# Patient Record
Sex: MALE | Race: WHITE
[De-identification: names, ages, dates, MRNs, and addresses within clinical notes are randomized per-mention and may not be internally consistent; named-entity substitution may affect disease eponyms.]

---

## 2018-04-04 ENCOUNTER — HOSPITAL ENCOUNTER (OUTPATIENT)
Dept: HOSPITAL 75 - RAD | Age: 83
End: 2018-04-04
Attending: INTERNAL MEDICINE
Payer: MEDICARE

## 2018-04-04 DIAGNOSIS — R27.0: Primary | ICD-10-CM

## 2018-04-04 PROCEDURE — 70551 MRI BRAIN STEM W/O DYE: CPT

## 2018-04-04 NOTE — DIAGNOSTIC IMAGING REPORT
PROCEDURE: MR imaging of the brain without contrast.



TECHNIQUE: Multiplanar, multisequence MR imaging of the brain was

performed without contrast.



INDICATION: Ataxia.



FINDINGS: This exam is limited as only diffusion series was

obtained. Reportedly, the patient was claustrophobic and could

not tolerate the exam.



There is no mass, shift of the midline, or hemorrhage evident.

The ventricles are not abnormally dilated. There is cortical

atrophy present. The degree of atrophy is consistent with the

patient's age.



There is no abnormal signal arising from the brain to suggest an

area of acute ischemia.



The orbits and sinuses were not well visualized.



IMPRESSION:

1. There is no evidence for an area of acute ischemia.

2. There is no sign of a mass lesion either.

3. These results were called to Dr. Woo.



Dictated by: 



  Dictated on workstation # [id number]

## 2018-05-15 ENCOUNTER — HOSPITAL ENCOUNTER (OUTPATIENT)
Dept: HOSPITAL 75 - RAD | Age: 83
End: 2018-05-15
Attending: INTERNAL MEDICINE
Payer: MEDICARE

## 2018-05-15 DIAGNOSIS — T14.90XA: Primary | ICD-10-CM
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
## 2018-05-15 NOTE — DIAGNOSTIC IMAGING REPORT
INDICATION: Fall. Pelvic pain.



COMPARISON: None.



FINDINGS: AP view of the pelvis and two dedicated radiographic

views of the right hip were obtained. There is no fracture,

dislocation, bone destruction, or radiopaque foreign body. The

visualized pelvic osseous structures and the SI joints

demonstrate no acute fracture or dislocation. There is no bone

destruction or radiopaque foreign body. The surrounding soft

tissue structures are unremarkable. 



IMPRESSION: 

1. No acute fracture or dislocation in the pelvis or right hip

joint.



Dictated by: 



  Dictated on workstation # [id number]

## 2018-05-30 ENCOUNTER — HOSPITAL ENCOUNTER (EMERGENCY)
Dept: HOSPITAL 75 - ER | Age: 83
Discharge: HOME | End: 2018-05-30
Payer: MEDICARE

## 2018-05-30 VITALS — WEIGHT: 175 LBS | HEIGHT: 66 IN | BODY MASS INDEX: 28.12 KG/M2

## 2018-05-30 VITALS — DIASTOLIC BLOOD PRESSURE: 52 MMHG | SYSTOLIC BLOOD PRESSURE: 109 MMHG

## 2018-05-30 DIAGNOSIS — Z86.718: ICD-10-CM

## 2018-05-30 DIAGNOSIS — Z87.891: ICD-10-CM

## 2018-05-30 DIAGNOSIS — I10: ICD-10-CM

## 2018-05-30 DIAGNOSIS — M70.21: ICD-10-CM

## 2018-05-30 DIAGNOSIS — L03.113: Primary | ICD-10-CM

## 2018-05-30 DIAGNOSIS — Z86.73: ICD-10-CM

## 2018-05-30 LAB
BASOPHILS # BLD AUTO: 0 10^3/UL (ref 0–0.1)
BASOPHILS NFR BLD AUTO: 0 % (ref 0–10)
EOSINOPHIL # BLD AUTO: 0.1 10^3/UL (ref 0–0.3)
EOSINOPHIL NFR BLD AUTO: 0 % (ref 0–10)
ERYTHROCYTE [DISTWIDTH] IN BLOOD BY AUTOMATED COUNT: 12.9 % (ref 10–14.5)
HCT VFR BLD CALC: 37 % (ref 40–54)
HGB BLD-MCNC: 12.1 G/DL (ref 13.3–17.7)
LYMPHOCYTES # BLD AUTO: 1.4 X 10^3 (ref 1–4)
LYMPHOCYTES NFR BLD AUTO: 10 % (ref 12–44)
MANUAL DIFFERENTIAL PERFORMED BLD QL: NO
MCH RBC QN AUTO: 31 PG (ref 25–34)
MCHC RBC AUTO-ENTMCNC: 33 G/DL (ref 32–36)
MCV RBC AUTO: 93 FL (ref 80–99)
MONOCYTES # BLD AUTO: 0.8 X 10^3 (ref 0–1)
MONOCYTES NFR BLD AUTO: 6 % (ref 0–12)
NEUTROPHILS # BLD AUTO: 11.2 X 10^3 (ref 1.8–7.8)
NEUTROPHILS NFR BLD AUTO: 83 % (ref 42–75)
PLATELET # BLD: 159 10^3/UL (ref 130–400)
PMV BLD AUTO: 11 FL (ref 7.4–10.4)
RBC # BLD AUTO: 3.91 10^6/UL (ref 4.35–5.85)
WBC # BLD AUTO: 13.4 10^3/UL (ref 4.3–11)

## 2018-05-30 PROCEDURE — 36415 COLL VENOUS BLD VENIPUNCTURE: CPT

## 2018-05-30 PROCEDURE — 86141 C-REACTIVE PROTEIN HS: CPT

## 2018-05-30 PROCEDURE — 76881 US COMPL JOINT R-T W/IMG: CPT

## 2018-05-30 PROCEDURE — 85025 COMPLETE CBC W/AUTO DIFF WBC: CPT

## 2018-05-30 NOTE — ED GENERAL
General


Chief Complaint:  Upper Extremity


Stated Complaint:  RIGHT ARM SWELLING/REDNESS


Nursing Triage Note:  


pt reports r elbow swelling and pain starting yesterday evening.


Nursing Sepsis Screen:  No Definite Risk


Source of Information:  Patient


Exam Limitations:  No Limitations





History of Present Illness


Date Seen by Provider:  May 30, 2018


Time Seen by Provider:  12:49


Initial Comments


87-year-old male patient presents to the emergency department complains of 

right elbow pain, redness, and swelling beginning yesterday evening.  Denies 

known injury.  Denies fevers.


Timing/Duration:  12-24 Hours, Getting Worse


Modifying Factors:  worse with Other (increased pain with palpation)





Allergies and Home Medications


Allergies


Coded Allergies:  


     No Known Drug Allergies (Unverified , 5/30/18)





Home Medications


Clindamycin HCl 300 Mg Capsule, 300 MG PO QID


   Prescribed by: KARLY DAVIS on 5/30/18 2951





Patient Home Medication List


Home Medication List Reviewed:  Yes





Review of Systems


Constitutional:  No chills, No fever, No malaise


Respiratory:  no symptoms reported


Cardiovascular:  no symptoms reported


Gastrointestinal:  no symptoms reported


Musculoskeletal:  see HPI, joint pain (right elbow pain), joint swelling (right 

elbow)


Skin:  change in color (erythema right elbow)


Psychiatric/Neurological:  No Symptoms Reported


All Other Systems Reviewed


Negative Unless Noted:  Yes (Negative excepted noted.)





Past Medical-Social-Family Hx


Past Med/Social Hx:  Reviewed Nursing Past Med/Soc Hx


Patient Social History


Alcohol Use:  Denies Use


Recreational Drug Use:  No


Smoking Status:  Former Smoker


Former Smoker, Quit:  May 23, 1979


Recent Foreign Travel:  No


Contact w/Someone Who Travel:  No


Recent Infectious Disease Expo:  No


Physical Abuse:  No


Sexual Abuse:  No


Fear:  No





Past Medical History


Surgeries:  Yes


Orthopedic (back surgery x2)


Respiratory:  No


Cardiac:  Yes


Deep Vein Thrombosis, Hypertension


Neurological:  Yes


Stroke (with right sided residual effects)


Gastrointestinal:  No


Musculoskeletal:  Yes (right sided residual effects secondary to CVA.)


Endocrine:  No


Nursing Suicide Risk Score:  0





Family Medical History


Reviewed Nursing Family Hx


No Pertinent Family Hx





Physical Exam


Vital Signs





Vital Signs - First Documented








 5/30/18





 12:33


 


Temp 97.3


 


Pulse 58


 


Resp 18


 


B/P (MAP) 111/58 (75)


 


Pulse Ox 94


 


O2 Delivery Room Air





Capillary Refill : Less Than 3 Seconds


General Appearance:  No Apparent Distress, WD/WN


HEENT:  PERRL/EOMI, Pharynx Normal


Neck:  Normal Inspection, Supple


Respiratory:  Lungs Clear, Normal Breath Sounds, No Accessory Muscle Use, No 

Respiratory Distress


Cardiovascular:  Regular Rate, Rhythm, No Murmur, Normal Peripheral Pulses


Extremity:  Normal Capillary Refill, Normal Range of Motion (no pain with ROM 

of the right elbow), Other (right medial elbow swelling, mild warmth, and soft 

tissue tenderness.  12x6 cm area of erythema noted to the right posterior elbow 

with an olecranon bursa effusion.  )


Neurologic/Psychiatric:  Alert, Oriented x3, Normal Mood/Affect


Skin:  Normal Color, Warm/Dry, Other (right medial elbow swelling, mild warmth, 

and soft tissue tenderness.  12x6 cm area of erythema noted to the right 

posterior elbow with an olecranon bursa effusion.  )





Progress/Results/Core Measures


Suspected Sepsis


Recent Fever Within 48 Hours:  No


Infection Criteria Present:  Suspected New Infection


New/Unexplained  Altered Menta:  No


Sepsis Screen:  No Definite Risk


SIRS


Temperature:97.3 


Pulse: 58 


Respiratory Rate: 18


 


Laboratory Tests


5/30/18 12:27: White Blood Count 13.4H


Blood Pressure 111 /58 


Mean: 75


 





Laboratory Tests


5/30/18 12:27: Platelet Count 159








Results/Orders


Lab Results





Laboratory Tests








Test


 5/30/18


12:27 Range/Units


 


 


White Blood Count


 13.4 H


 4.3-11.0


10^3/uL


 


Red Blood Count


 3.91 L


 4.35-5.85


10^6/uL


 


Hemoglobin 12.1 L 13.3-17.7  G/DL


 


Hematocrit 37 L 40-54  %


 


Mean Corpuscular Volume 93  80-99  FL


 


Mean Corpuscular Hemoglobin 31  25-34  PG


 


Mean Corpuscular Hemoglobin


Concent 33 


 32-36  G/DL





 


Red Cell Distribution Width 12.9  10.0-14.5  %


 


Platelet Count


 159 


 130-400


10^3/uL


 


Mean Platelet Volume 11.0 H 7.4-10.4  FL


 


Neutrophils (%) (Auto) 83 H 42-75  %


 


Lymphocytes (%) (Auto) 10 L 12-44  %


 


Monocytes (%) (Auto) 6  0-12  %


 


Eosinophils (%) (Auto) 0  0-10  %


 


Basophils (%) (Auto) 0  0-10  %


 


Neutrophils # (Auto) 11.2 H 1.8-7.8  X 10^3


 


Lymphocytes # (Auto) 1.4  1.0-4.0  X 10^3


 


Monocytes # (Auto) 0.8  0.0-1.0  X 10^3


 


Eosinophils # (Auto)


 0.1 


 0.0-0.3


10^3/uL


 


Basophils # (Auto)


 0.0 


 0.0-0.1


10^3/uL


 


C-Reactive Protein High


Sensitivity 8.94 H


 0.00-0.50


MG/DL








My Orders





Orders - KARLY DAVIS


Us Right Up Ext Nonvasc [id number] (5/30/18 12:55)


Cbc With Automated Diff (5/30/18 12:55)


Hs C Reactive Protein (5/30/18 12:55)


Saline Lock/Iv-Start (5/30/18 12:55)


Clindamycin Capsule (Cleocin Capsule) (5/30/18 14:00)





Medications Given in ED





Current Medications








 Medications  Dose


 Ordered  Sig/Robb


 Route  Start Time


 Stop Time Status Last Admin


Dose Admin


 


 Clindamycin HCl  600 mg  ONCE  ONCE


 PO  5/30/18 14:00


 5/30/18 14:01 DC 5/30/18 14:24


600 MG








Vital Signs/I&O











 5/30/18 5/30/18





 12:33 14:29


 


Temp 97.3 


 


Pulse 58 60


 


Resp 18 20


 


B/P (MAP) 111/58 (75) 109/52


 


Pulse Ox 94 97


 


O2 Delivery Room Air 





Capillary Refill : Less Than 3 Seconds








Blood Pressure Mean:  75











Diagnostic Imaging





   Diagonstic Imaging:  Ultrasound


   Plain Films/CT/US/NM/MRI:  other (right upper extremity)


Comments


US RIGHT UP EXT NONVASC [id number] Indication: Redness along the medial aspect of 

the right elbow. Sonographic interrogation of the area of redness of the right 

elbow was performed. No fluid collection is seen to suggest abscess. Visualized 

venous structures at this location are patent without evidence of thrombus. 

Impression: No sonographic abnormality is detected. Dictated by: Dictated on 

workstation # [id number]


   Reviewed:  Reviewed by Me (radiology report reviewed by me)





Departure


Communication (Admissions)


Laboratory and diagnostic findings discussed with the patient and wife.  We'll 

plan for discharge to home.  Wife and patient do not want IV antibiotics given. 

States they would prefer oral meds.  Patient was given Cleocin 600 mg by mouth 

1 dose in the emergency department.  Patient to f/u with dr lawson in his 

office tomorrow for recheck.  Wife states she called the office and patient is 

scheduled for tomorrow morning for recheck.  Patient to return immediately to 

the emergency department for worsened symptoms or any other concerns.





Impression





 Primary Impression:  


 Cellulitis of right upper extremity


 Additional Impression:  


 Olecranon bursitis


 Qualified Codes:  M70.21 - Olecranon bursitis, right elbow


Disposition:  01 HOME, SELF-CARE


Condition:  Improved





Departure-Patient Inst.


Decision time for Depature:  13:57


Referrals:  


JOSE LAWSON DO (PCP/Family)


Primary Care Physician


Patient Instructions:  Cellulitis (Skin Infection), Adult (DC), Olecranon 

Bursitis (DC)





Add. Discharge Instructions:  


All discharge instructions reviewed with patient and/or family. Voiced 

understanding.  Medications as instructed.  Continue usual home medications.  

Tylenol over the counter as directed for pain.  Elevate the right arm on 

pillows above the level of the heart.  Follow-up with Dr. Lawson for recheck 

tomorrow in his office.  Call today for an appointment time.  Return to the 

emergency department for worsened pain, redness, fever, drainage, or any other 

concerns.


Scripts


Clindamycin HCl (Clindamycin HCl) 300 Mg Capsule


300 MG PO QID, #28 CAP 0 Refills


   Prov: KARLY DAVIS         5/30/18











KARLY DAVIS May 30, 2018 12:49

## 2018-05-30 NOTE — XMS REPORT
Continuity of Care Document

 Created on: 2018



LARA KING

External Reference #: [id number]

: 1930

Sex: Male



Demographics







 Address  [address] [place]  [address]

 

 Home Phone  [phone] x

 

 Preferred Language  Unknown

 

 Marital Status  Unknown

 

 [employer] Affiliation  Unknown

 

 Race  Unknown

 

 Ethnic Group  Unknown





Author







 Author  [facility]

 

 Organization  [facility]

 

 Address  Unknown

 

 Phone  Unavailable



              



Allergies

      



There is no data.                  



Medications

      



There is no data.                  



Problems

      





 Date Dx Coded            Attending            Type            Code            
Diagnosis            Diagnosed By        

 

 2018            JOSE REY DO, Ot            R27.0   
         ATAXIA, UNSPECIFIED                     

 

 2018            JOSE REY DO, Ot            R27.0   
         ATAXIA, UNSPECIFIED                     

 

 2018            JOSE REY DO, Ot            R27.0   
         ATAXIA, UNSPECIFIED                     

 

 2018            JOSE REY DO, Ot            T14.90XA
            INJURY, UNSPECIFIED, INITIAL ENCOUNTER                     

 

 2018            JOSE REY DO, Ot            W19.XXXA
            UNSPECIFIED FALL, INITIAL ENCOUNTER                     



                          



Procedures

      



There is no data.                  



Results

      



There is no data.              



Encounters

      





 ACCT No.            Visit Date/Time            Discharge            Status    
        Pt. Type            Provider            Facility            Loc./Unit  
          Complaint        

 

 [id number]            05/15/2018 13:10:00            05/15/2018 23:59:59    
        [facility]            Outpatient            JOSE REY DO            
[facility]            RAD            TRAUMA        

 

 [id number]            2018 15:37:00            2018 23:59:59    
        [facility]            Outpatient            JOSE REY DO            
[facility]            RAD            ATOXIA R27.0        

 

 [id number]            2014 10:26:00            2014 23:59:59    
        [facility]            Outpatient

## 2018-05-30 NOTE — DIAGNOSTIC IMAGING REPORT
Indication: Redness along the medial aspect of the right elbow.



Sonographic interrogation of the area of redness of the right

elbow was performed. No fluid collection is seen to suggest

abscess. Visualized venous structures at this location are patent

without evidence of thrombus.



Impression: No sonographic abnormality is detected.



Dictated by: 



  Dictated on workstation # [id number]

## 2019-12-15 ENCOUNTER — HOSPITAL ENCOUNTER (INPATIENT)
Dept: HOSPITAL 75 - ER | Age: 84
LOS: 8 days | Discharge: NURSING, REHAB OR LONG-TERM CARE | DRG: 329 | End: 2019-12-23
Attending: SURGERY | Admitting: SURGERY
Payer: MEDICARE

## 2019-12-15 VITALS — DIASTOLIC BLOOD PRESSURE: 93 MMHG | SYSTOLIC BLOOD PRESSURE: 151 MMHG

## 2019-12-15 VITALS — DIASTOLIC BLOOD PRESSURE: 49 MMHG | SYSTOLIC BLOOD PRESSURE: 117 MMHG

## 2019-12-15 VITALS — DIASTOLIC BLOOD PRESSURE: 71 MMHG | SYSTOLIC BLOOD PRESSURE: 121 MMHG

## 2019-12-15 VITALS — DIASTOLIC BLOOD PRESSURE: 68 MMHG | SYSTOLIC BLOOD PRESSURE: 140 MMHG

## 2019-12-15 VITALS — DIASTOLIC BLOOD PRESSURE: 62 MMHG | SYSTOLIC BLOOD PRESSURE: 130 MMHG

## 2019-12-15 VITALS — SYSTOLIC BLOOD PRESSURE: 116 MMHG | DIASTOLIC BLOOD PRESSURE: 91 MMHG

## 2019-12-15 VITALS — SYSTOLIC BLOOD PRESSURE: 140 MMHG | DIASTOLIC BLOOD PRESSURE: 65 MMHG

## 2019-12-15 VITALS — HEIGHT: 64.96 IN | BODY MASS INDEX: 33.57 KG/M2 | WEIGHT: 201.5 LBS

## 2019-12-15 VITALS — DIASTOLIC BLOOD PRESSURE: 98 MMHG | SYSTOLIC BLOOD PRESSURE: 130 MMHG

## 2019-12-15 DIAGNOSIS — N40.0: ICD-10-CM

## 2019-12-15 DIAGNOSIS — N18.9: ICD-10-CM

## 2019-12-15 DIAGNOSIS — I69.351: ICD-10-CM

## 2019-12-15 DIAGNOSIS — Z85.828: ICD-10-CM

## 2019-12-15 DIAGNOSIS — I50.31: ICD-10-CM

## 2019-12-15 DIAGNOSIS — D64.9: ICD-10-CM

## 2019-12-15 DIAGNOSIS — K57.20: Primary | ICD-10-CM

## 2019-12-15 DIAGNOSIS — Z86.718: ICD-10-CM

## 2019-12-15 DIAGNOSIS — I48.91: ICD-10-CM

## 2019-12-15 DIAGNOSIS — M19.91: ICD-10-CM

## 2019-12-15 DIAGNOSIS — I13.0: ICD-10-CM

## 2019-12-15 DIAGNOSIS — Z87.891: ICD-10-CM

## 2019-12-15 DIAGNOSIS — F41.9: ICD-10-CM

## 2019-12-15 DIAGNOSIS — H26.9: ICD-10-CM

## 2019-12-15 LAB
ALBUMIN SERPL-MCNC: 3.9 GM/DL (ref 3.2–4.5)
ALP SERPL-CCNC: 73 U/L (ref 40–136)
ALT SERPL-CCNC: 23 U/L (ref 0–55)
BASOPHILS # BLD AUTO: 0 10^3/UL (ref 0–0.1)
BASOPHILS NFR BLD AUTO: 0 % (ref 0–10)
BILIRUB SERPL-MCNC: 0.5 MG/DL (ref 0.1–1)
BUN/CREAT SERPL: 16
CALCIUM SERPL-MCNC: 8.7 MG/DL (ref 8.5–10.1)
CHLORIDE SERPL-SCNC: 104 MMOL/L (ref 98–107)
CO2 SERPL-SCNC: 21 MMOL/L (ref 21–32)
CREAT SERPL-MCNC: 1.44 MG/DL (ref 0.6–1.3)
EOSINOPHIL # BLD AUTO: 0.2 10^3/UL (ref 0–0.3)
EOSINOPHIL NFR BLD AUTO: 2 % (ref 0–10)
ERYTHROCYTE [DISTWIDTH] IN BLOOD BY AUTOMATED COUNT: 13.2 % (ref 10–14.5)
GFR SERPLBLD BASED ON 1.73 SQ M-ARVRAT: 46 ML/MIN
GLUCOSE SERPL-MCNC: 114 MG/DL (ref 70–105)
HCT VFR BLD CALC: 37 % (ref 40–54)
HGB BLD-MCNC: 11.9 G/DL (ref 13.3–17.7)
LIPASE SERPL-CCNC: 46 U/L (ref 8–78)
LYMPHOCYTES # BLD AUTO: 1.1 X 10^3 (ref 1–4)
LYMPHOCYTES NFR BLD AUTO: 10 % (ref 12–44)
MANUAL DIFFERENTIAL PERFORMED BLD QL: NO
MCH RBC QN AUTO: 29 PG (ref 25–34)
MCHC RBC AUTO-ENTMCNC: 32 G/DL (ref 32–36)
MCV RBC AUTO: 91 FL (ref 80–99)
MONOCYTES # BLD AUTO: 0.5 X 10^3 (ref 0–1)
MONOCYTES NFR BLD AUTO: 5 % (ref 0–12)
NEUTROPHILS # BLD AUTO: 8.9 X 10^3 (ref 1.8–7.8)
NEUTROPHILS NFR BLD AUTO: 83 % (ref 42–75)
PLATELET # BLD: 159 10^3/UL (ref 130–400)
PMV BLD AUTO: 10.9 FL (ref 7.4–10.4)
POTASSIUM SERPL-SCNC: 4.5 MMOL/L (ref 3.6–5)
PROT SERPL-MCNC: 7.3 GM/DL (ref 6.4–8.2)
SODIUM SERPL-SCNC: 139 MMOL/L (ref 135–145)
WBC # BLD AUTO: 10.7 10^3/UL (ref 4.3–11)

## 2019-12-15 PROCEDURE — 94760 N-INVAS EAR/PLS OXIMETRY 1: CPT

## 2019-12-15 PROCEDURE — 86900 BLOOD TYPING SEROLOGIC ABO: CPT

## 2019-12-15 PROCEDURE — 88307 TISSUE EXAM BY PATHOLOGIST: CPT

## 2019-12-15 PROCEDURE — 36415 COLL VENOUS BLD VENIPUNCTURE: CPT

## 2019-12-15 PROCEDURE — 85007 BL SMEAR W/DIFF WBC COUNT: CPT

## 2019-12-15 PROCEDURE — 93306 TTE W/DOPPLER COMPLETE: CPT

## 2019-12-15 PROCEDURE — 83735 ASSAY OF MAGNESIUM: CPT

## 2019-12-15 PROCEDURE — 86850 RBC ANTIBODY SCREEN: CPT

## 2019-12-15 PROCEDURE — 82962 GLUCOSE BLOOD TEST: CPT

## 2019-12-15 PROCEDURE — 85025 COMPLETE CBC W/AUTO DIFF WBC: CPT

## 2019-12-15 PROCEDURE — 87081 CULTURE SCREEN ONLY: CPT

## 2019-12-15 PROCEDURE — 85027 COMPLETE CBC AUTOMATED: CPT

## 2019-12-15 PROCEDURE — 93005 ELECTROCARDIOGRAM TRACING: CPT

## 2019-12-15 PROCEDURE — 83880 ASSAY OF NATRIURETIC PEPTIDE: CPT

## 2019-12-15 PROCEDURE — 94664 DEMO&/EVAL PT USE INHALER: CPT

## 2019-12-15 PROCEDURE — 83690 ASSAY OF LIPASE: CPT

## 2019-12-15 PROCEDURE — 71045 X-RAY EXAM CHEST 1 VIEW: CPT

## 2019-12-15 PROCEDURE — 74176 CT ABD & PELVIS W/O CONTRAST: CPT

## 2019-12-15 PROCEDURE — 86901 BLOOD TYPING SEROLOGIC RH(D): CPT

## 2019-12-15 PROCEDURE — 80048 BASIC METABOLIC PNL TOTAL CA: CPT

## 2019-12-15 PROCEDURE — 96375 TX/PRO/DX INJ NEW DRUG ADDON: CPT

## 2019-12-15 PROCEDURE — 84100 ASSAY OF PHOSPHORUS: CPT

## 2019-12-15 PROCEDURE — 80053 COMPREHEN METABOLIC PANEL: CPT

## 2019-12-15 PROCEDURE — 94640 AIRWAY INHALATION TREATMENT: CPT

## 2019-12-15 PROCEDURE — 96374 THER/PROPH/DIAG INJ IV PUSH: CPT

## 2019-12-15 RX ADMIN — FENTANYL CITRATE PRN MCG: 50 INJECTION, SOLUTION INTRAMUSCULAR; INTRAVENOUS at 22:55

## 2019-12-15 RX ADMIN — SODIUM CHLORIDE SCH MLS/HR: 900 INJECTION, SOLUTION INTRAVENOUS at 21:13

## 2019-12-15 RX ADMIN — SODIUM CHLORIDE SCH MLS/HR: 900 INJECTION, SOLUTION INTRAVENOUS at 16:36

## 2019-12-15 RX ADMIN — ENOXAPARIN SODIUM SCH MG: 30 INJECTION SUBCUTANEOUS at 21:12

## 2019-12-15 RX ADMIN — FENTANYL CITRATE PRN MCG: 50 INJECTION, SOLUTION INTRAMUSCULAR; INTRAVENOUS at 17:54

## 2019-12-15 RX ADMIN — FENTANYL CITRATE PRN MCG: 50 INJECTION, SOLUTION INTRAMUSCULAR; INTRAVENOUS at 21:13

## 2019-12-15 NOTE — DIAGNOSTIC IMAGING REPORT
PROCEDURE: CT urinary tract, rule out kidney stone.



TECHNIQUE: Multiple contiguous axial images were obtained through

the abdomen and pelvis without the use of intravenous contrast.

Auto Exposure Controls were utilized during the CT exam to meet

ALARA standards for radiation dose reduction. 



INDICATION: Lower abdominal pain.



FINDINGS: The lung bases are clear. Heart size is normal. The

liver is normal in size and without focal lesions. Gallbladder is

unremarkable. There is no biliary ductal dilatation. Spleen is

normal. The pancreas and adrenal glands are unremarkable there is

free intraperitoneal air. This is likely secondary to perforated

diverticulitis in the upper sigmoid colon. There is some wispy

inflammatory change and fluid however no discrete fluid

collection appreciated to suggest abscess. There is moderate

atherosclerotic calcification of the aorta, which is

nonaneurysmal. There is no pelvic mass or adenopathy. There are

moderate degenerative changes in the spine.



IMPRESSION: Findings most suspect for perforated diverticulitis

as there are pockets of free air about the upper sigmoid colon.

There are inflammatory changes and some fluid although no

discrete abscess.



Dictated by: 



  Dictated on workstation # [id number]

## 2019-12-15 NOTE — ED ABDOMINAL PAIN
General


Stated Complaint:  STOMACH PAIN


Source of Information:  Patient


Exam Limitations:  No Limitations





History of Present Illness


Date Seen by Provider:  Dec 15, 2019


Time Seen by Provider:  14:16


Initial Comments


[id number] ER with suprapubic abdominal pain since this morning. No dysuria, no 

bowel changes. No history of this no fever no chills no nausea no vomiting


Timing/Duration:  4-6 Hours


Severity/Quality:  Moderate


Location:  Suprapubic


Radiation:  No Radiation


Activities at Onset:  None





Allergies and Home Medications


Allergies


Coded Allergies:  


     No Known Drug Allergies (Unverified , 5/30/18)





Home Medications


Clindamycin HCl 300 Mg Capsule, 300 MG PO QID


   Prescribed by: KARLY DAVIS on 5/30/18 8593





Patient Home Medication List


Home Medication List Reviewed:  Yes





Review of Systems


Review of Systems


Constitutional:  see HPI


EENTM:  No Symptoms Reported


Respiratory:  No Symptoms Reported


Cardiovascular:  No Symptoms Reported


Gastrointestinal:  See HPI, Abdominal Pain


Genitourinary:  No Symptoms Reported


Musculoskeletal:  no symptoms reported


Skin:  no symptoms reported


Psychiatric/Neurological:  No Symptoms Reported


Endocrine:  No Symptoms Reported


Hematologic/Lymphatic:  No Symptoms Reported





Past Medical-Social-Family Hx


Patient Social History


Former Smoker, Quit:  May 23, 1979


Recent Foreign Travel:  No


Contact w/Someone Who Travel:  No





Past Medical History


Surgeries:  Yes


Orthopedic


Respiratory:  No


Cardiac:  Yes


Deep Vein Thrombosis, Hypertension


Neurological:  Yes


Stroke


Genitourinary:  No


Gastrointestinal:  No


Musculoskeletal:  Yes (right sided residual effects secondary to CVA.)


Endocrine:  No


HEENT:  No


Cancer:  No


Psychosocial:  No


Integumentary:  No





Family Medical History


No Pertinent Family Hx





Physical Exam


Vital Signs





Vital Signs - First Documented








 12/15/19





 14:02


 


Temp 37.0


 


Pulse 62


 


Resp 15


 


B/P (MAP) 143/84 (103)


 


Pulse Ox 94


 


O2 Delivery Room Air





Capillary Refill :


Height/Weight/BMI


Height: 5'6.00"


Weight: 175lbs. oz. 79.586911gs;  BMI


Method:Stated


General Appearance:  WD/WN, no apparent distress, other (able to answer 

questions but wife answers all questions for him)


HEENT:  PERRL/EOMI, normal ENT inspection


Respiratory:  normal breath sounds, no respiratory distress, no accessory muscle

use


Cardiovascular:  regular rate, rhythm, no murmur


Gastrointestinal:  normal bowel sounds, soft, tenderness (Suprapubic)


Neurologic/Psychiatric:  alert, normal mood/affect, oriented x 3


Skin:  normal color, warm/dry





Progress/Results/Core Measures


Results/Orders


Lab Results





Laboratory Tests








Test


 12/15/19


14:21 12/15/19


14:47 Range/Units


 


 


Sodium Level 139   135-145  MMOL/L


 


Potassium Level 4.5   3.6-5.0  MMOL/L


 


Chloride Level 104     MMOL/L


 


Carbon Dioxide Level 21   21-32  MMOL/L


 


Anion Gap 14   5-14  MMOL/L


 


Blood Urea Nitrogen 23 H  7-18  MG/DL


 


Creatinine


 1.44 H


 


 0.60-1.30


MG/DL


 


Estimat Glomerular Filtration


Rate 46 


 


  





 


BUN/Creatinine Ratio 16    


 


Glucose Level 114 H    MG/DL


 


Calcium Level 8.7   8.5-10.1  MG/DL


 


Corrected Calcium 8.8   8.5-10.1  MG/DL


 


Total Bilirubin 0.5   0.1-1.0  MG/DL


 


Aspartate Amino Transf


(AST/SGOT) 31 


 


 5-34  U/L





 


Alanine Aminotransferase


(ALT/SGPT) 23 


 


 0-55  U/L





 


Alkaline Phosphatase 73     U/L


 


Total Protein 7.3   6.4-8.2  GM/DL


 


Albumin 3.9   3.2-4.5  GM/DL


 


Lipase 46   8-78  U/L


 


White Blood Count


 


 10.7 


 4.3-11.0


10^3/uL


 


Red Blood Count


 


 4.05 L


 4.35-5.85


10^6/uL


 


Hemoglobin  11.9 L 13.3-17.7  G/DL


 


Hematocrit  37 L 40-54  %


 


Mean Corpuscular Volume  91  80-99  FL


 


Mean Corpuscular Hemoglobin  29  25-34  PG


 


Mean Corpuscular Hemoglobin


Concent 


 32 


 32-36  G/DL





 


Red Cell Distribution Width  13.2  10.0-14.5  %


 


Platelet Count


 


 159 


 130-400


10^3/uL


 


Mean Platelet Volume  10.9 H 7.4-10.4  FL


 


Neutrophils (%) (Auto)  83 H 42-75  %


 


Lymphocytes (%) (Auto)  10 L 12-44  %


 


Monocytes (%) (Auto)  5  0-12  %


 


Eosinophils (%) (Auto)  2  0-10  %


 


Basophils (%) (Auto)  0  0-10  %


 


Neutrophils # (Auto)  8.9 H 1.8-7.8  X 10^3


 


Lymphocytes # (Auto)  1.1  1.0-4.0  X 10^3


 


Monocytes # (Auto)  0.5  0.0-1.0  X 10^3


 


Eosinophils # (Auto)


 


 0.2 


 0.0-0.3


10^3/uL


 


Basophils # (Auto)


 


 0.0 


 0.0-0.1


10^3/uL








My Orders





Orders - ESTHER FLORES APRN


Cbc With Automated Diff (12/15/19 14:13)


Comprehensive Metabolic Panel (12/15/19 14:13)


Lipase (12/15/19 14:13)


Ua Culture If Indicated (12/15/19 14:13)


Ct Abd/Pelvis Wo(Kidney Stone) (12/15/19 14:13)


Ketorolac Injection (Toradol Injection) (12/15/19 14:15)


Piperacillin Sodium/Tazobactam (Zosyn Vi (12/15/19 15:15)


Fentanyl  Injection (Sublimaze Injection (12/15/19 15:15)





Medications Given in ED





Current Medications








 Medications  Dose


 Ordered  Sig/Robb


 Route  Start Time


 Stop Time Status Last Admin


Dose Admin


 


 Ketorolac


 Tromethamine  15 mg  ONCE  ONCE


 IVP  12/15/19 14:15


 12/15/19 14:16 DC 12/15/19 14:30


15 MG








Vital Signs/I&O











 12/15/19





 14:02


 


Temp 37.0


 


Pulse 62


 


Resp 15


 


B/P (MAP) 143/84 (103)


 


Pulse Ox 94


 


O2 Delivery Room Air











Departure


Communication (Admissions)


Time/Spoke to Admitting Phy:  15:21


Dr. Malcolm, will admit, IV Zosyn, pain medications and nothing by mouth status 

repeat labs in the morning. Patient and wife up on this plan and both agreeable.

Wife would like him to be full CODE STATUS.





Impression





   Primary Impression:  


   Perforated diverticulum


Disposition:  09 ADMITTED AS INPATIENT


Condition:  Stable





Admissions


Decision to Admit Reason:  Admit from ER (General)


Decision to Admit/Date:  Dec 15, 2019


Time/Decision to Admit Time:  15:21





Departure-Patient Inst.


Referrals:  


JOSE REY DO (PCP/Family)


Primary Care Physician











ESTHER FLORES             Dec 15, 2019 14:18


POS

## 2019-12-15 NOTE — HISTORY AND PHYSICAL
DATE OF SERVICE:  



ATTENDING PRIMARY CARE PHYSICIAN:

Erasto Woo DO



HISTORY OF PRESENT ILLNESS:

The patient is an 89-year-old male who presented to the Emergency Department

with suprapubic pain.  He reports that this started this morning.  He does not

report ever having these symptoms before in the past.  He also does not report

any nausea or vomiting and states that he has been having normal bowel movements

with no severe constipation as well as no red blood per rectum nor any dark

tarry stools.  He also does not report any change in urinary status as well as

no fever, no chills.  A CT scan was performed, which did show what appeared to

be a perforation of the sigmoid colon with no identifiable abscess or fluid

collections.  The amount of air appears to be relatively small.



PAST MEDICAL HISTORY:

Hypertension, history of DVT, history of CVA, degenerative joint disease.



PAST SURGICAL HISTORY:

Orthopedic procedure.



ALLERGIES:

No known drug allergies.



MEDICATIONS:

Plavix, diclofenac, enalapril, hydrochlorothiazide, ketoconazole, propranolol,

terazosin.



SOCIAL HISTORY:

Previous smoker, quit 79.  Negative alcohol.



FAMILY HISTORY:

Noncontributory.



VITAL SIGNS:

Blood pressure 143/84, temperature 37.0, pulse 62, respirations 15 with a pulse

ox 94% on room air.



REVIEW OF SYSTEMS:

Well-nourished male, in no acute distress.  He is not experiencing any shortness

of breath or difficulty breathing.  No chest pain, palpitations, diaphoresis.  A

suprapubic pain _____.  No change in bowel habits.  No red blood per rectum nor

any dark tarry stools.  No fever, chills, no recent inadvertent weight loss. 

All other review of systems negative.



PHYSICAL EXAMINATION:

CHEST:  A few scattered rales and rhonchi bilaterally.

HEART:  Regular, no murmurs.

EXTREMITIES:  A +1/3 bilateral lower extremity edema, negative Homans sign.

HEENT:  No scleral icterus.

NECK:  No cervical lymphadenopathy.

ABDOMEN:  Soft, nondistended.  There is pain in the suprapubic region upon deep

palpation.  No peritoneal signs.

SKIN:  Warm, dry.



LABORATORY DATA:

WBC 10.7, hemoglobin 11.9, hematocrit 37, platelets 159.  BUN 23, creatinine

1.44.  Liver function enzymes are normal.



ASSESSMENT AND PLAN:

An 89-year-old male with isolated sigmoid diverticular perforation with no

peritoneal signs.  There was age and medical comorbidities, we will proceed with

conservative management with repeat imaging as well as IV antibiotics and bowel

rest and adequate pain control if necessary.





Job ID: [id number]

DocumentID: [id number]

Dictated Date:  12/15/2019 16:33:10

Transcription Date: 12/15/2019 17:06:33

Dictated By: SUNG BARR MD

## 2019-12-16 VITALS — SYSTOLIC BLOOD PRESSURE: 120 MMHG | DIASTOLIC BLOOD PRESSURE: 68 MMHG

## 2019-12-16 VITALS — DIASTOLIC BLOOD PRESSURE: 104 MMHG | SYSTOLIC BLOOD PRESSURE: 171 MMHG

## 2019-12-16 VITALS — DIASTOLIC BLOOD PRESSURE: 96 MMHG | SYSTOLIC BLOOD PRESSURE: 125 MMHG

## 2019-12-16 VITALS — DIASTOLIC BLOOD PRESSURE: 63 MMHG | SYSTOLIC BLOOD PRESSURE: 131 MMHG

## 2019-12-16 VITALS — DIASTOLIC BLOOD PRESSURE: 72 MMHG | SYSTOLIC BLOOD PRESSURE: 130 MMHG

## 2019-12-16 VITALS — SYSTOLIC BLOOD PRESSURE: 184 MMHG | DIASTOLIC BLOOD PRESSURE: 102 MMHG

## 2019-12-16 VITALS — SYSTOLIC BLOOD PRESSURE: 133 MMHG | DIASTOLIC BLOOD PRESSURE: 64 MMHG

## 2019-12-16 VITALS — SYSTOLIC BLOOD PRESSURE: 135 MMHG | DIASTOLIC BLOOD PRESSURE: 100 MMHG

## 2019-12-16 VITALS — SYSTOLIC BLOOD PRESSURE: 134 MMHG | DIASTOLIC BLOOD PRESSURE: 114 MMHG

## 2019-12-16 VITALS — SYSTOLIC BLOOD PRESSURE: 140 MMHG | DIASTOLIC BLOOD PRESSURE: 77 MMHG

## 2019-12-16 VITALS — DIASTOLIC BLOOD PRESSURE: 72 MMHG | SYSTOLIC BLOOD PRESSURE: 126 MMHG

## 2019-12-16 VITALS — SYSTOLIC BLOOD PRESSURE: 132 MMHG | DIASTOLIC BLOOD PRESSURE: 54 MMHG

## 2019-12-16 VITALS — DIASTOLIC BLOOD PRESSURE: 72 MMHG | SYSTOLIC BLOOD PRESSURE: 122 MMHG

## 2019-12-16 VITALS — SYSTOLIC BLOOD PRESSURE: 138 MMHG | DIASTOLIC BLOOD PRESSURE: 71 MMHG

## 2019-12-16 VITALS — SYSTOLIC BLOOD PRESSURE: 131 MMHG | DIASTOLIC BLOOD PRESSURE: 62 MMHG

## 2019-12-16 VITALS — DIASTOLIC BLOOD PRESSURE: 56 MMHG | SYSTOLIC BLOOD PRESSURE: 130 MMHG

## 2019-12-16 VITALS — DIASTOLIC BLOOD PRESSURE: 100 MMHG | SYSTOLIC BLOOD PRESSURE: 150 MMHG

## 2019-12-16 VITALS — SYSTOLIC BLOOD PRESSURE: 104 MMHG | DIASTOLIC BLOOD PRESSURE: 61 MMHG

## 2019-12-16 VITALS — SYSTOLIC BLOOD PRESSURE: 142 MMHG | DIASTOLIC BLOOD PRESSURE: 73 MMHG

## 2019-12-16 VITALS — SYSTOLIC BLOOD PRESSURE: 120 MMHG | DIASTOLIC BLOOD PRESSURE: 51 MMHG

## 2019-12-16 VITALS — DIASTOLIC BLOOD PRESSURE: 78 MMHG | SYSTOLIC BLOOD PRESSURE: 136 MMHG

## 2019-12-16 VITALS — SYSTOLIC BLOOD PRESSURE: 128 MMHG | DIASTOLIC BLOOD PRESSURE: 73 MMHG

## 2019-12-16 VITALS — DIASTOLIC BLOOD PRESSURE: 53 MMHG | SYSTOLIC BLOOD PRESSURE: 122 MMHG

## 2019-12-16 VITALS — DIASTOLIC BLOOD PRESSURE: 56 MMHG | SYSTOLIC BLOOD PRESSURE: 136 MMHG

## 2019-12-16 VITALS — SYSTOLIC BLOOD PRESSURE: 109 MMHG | DIASTOLIC BLOOD PRESSURE: 60 MMHG

## 2019-12-16 VITALS — SYSTOLIC BLOOD PRESSURE: 147 MMHG | DIASTOLIC BLOOD PRESSURE: 90 MMHG

## 2019-12-16 VITALS — DIASTOLIC BLOOD PRESSURE: 104 MMHG | SYSTOLIC BLOOD PRESSURE: 184 MMHG

## 2019-12-16 VITALS — DIASTOLIC BLOOD PRESSURE: 71 MMHG | SYSTOLIC BLOOD PRESSURE: 174 MMHG

## 2019-12-16 VITALS — SYSTOLIC BLOOD PRESSURE: 139 MMHG | DIASTOLIC BLOOD PRESSURE: 58 MMHG

## 2019-12-16 LAB
ALBUMIN SERPL-MCNC: 3.8 GM/DL (ref 3.2–4.5)
ALP SERPL-CCNC: 64 U/L (ref 40–136)
ALT SERPL-CCNC: 31 U/L (ref 0–55)
BASOPHILS # BLD AUTO: 0 10^3/UL (ref 0–0.1)
BASOPHILS # BLD AUTO: 0 10^3/UL (ref 0–0.1)
BASOPHILS NFR BLD AUTO: 0 % (ref 0–10)
BASOPHILS NFR BLD AUTO: 0 % (ref 0–10)
BILIRUB SERPL-MCNC: 1.4 MG/DL (ref 0.1–1)
BUN/CREAT SERPL: 17
CALCIUM SERPL-MCNC: 8.5 MG/DL (ref 8.5–10.1)
CHLORIDE SERPL-SCNC: 107 MMOL/L (ref 98–107)
CO2 SERPL-SCNC: 21 MMOL/L (ref 21–32)
CREAT SERPL-MCNC: 1.64 MG/DL (ref 0.6–1.3)
EOSINOPHIL # BLD AUTO: 0 10^3/UL (ref 0–0.3)
EOSINOPHIL # BLD AUTO: 0 10^3/UL (ref 0–0.3)
EOSINOPHIL NFR BLD AUTO: 0 % (ref 0–10)
EOSINOPHIL NFR BLD AUTO: 0 % (ref 0–10)
ERYTHROCYTE [DISTWIDTH] IN BLOOD BY AUTOMATED COUNT: 13 % (ref 10–14.5)
ERYTHROCYTE [DISTWIDTH] IN BLOOD BY AUTOMATED COUNT: 13.3 % (ref 10–14.5)
GFR SERPLBLD BASED ON 1.73 SQ M-ARVRAT: 40 ML/MIN
GLUCOSE SERPL-MCNC: 128 MG/DL (ref 70–105)
HCT VFR BLD CALC: 36 % (ref 40–54)
HCT VFR BLD CALC: 39 % (ref 40–54)
HGB BLD-MCNC: 11.7 G/DL (ref 13.3–17.7)
HGB BLD-MCNC: 12.3 G/DL (ref 13.3–17.7)
LYMPHOCYTES # BLD AUTO: 0.7 X 10^3 (ref 1–4)
LYMPHOCYTES # BLD AUTO: 1 X 10^3 (ref 1–4)
LYMPHOCYTES NFR BLD AUTO: 4 % (ref 12–44)
LYMPHOCYTES NFR BLD AUTO: 6 % (ref 12–44)
MAGNESIUM SERPL-MCNC: 1.9 MG/DL (ref 1.6–2.4)
MANUAL DIFFERENTIAL PERFORMED BLD QL: NO
MANUAL DIFFERENTIAL PERFORMED BLD QL: YES
MCH RBC QN AUTO: 30 PG (ref 25–34)
MCH RBC QN AUTO: 30 PG (ref 25–34)
MCHC RBC AUTO-ENTMCNC: 32 G/DL (ref 32–36)
MCHC RBC AUTO-ENTMCNC: 32 G/DL (ref 32–36)
MCV RBC AUTO: 92 FL (ref 80–99)
MCV RBC AUTO: 93 FL (ref 80–99)
MONOCYTES # BLD AUTO: 0.5 X 10^3 (ref 0–1)
MONOCYTES # BLD AUTO: 0.6 X 10^3 (ref 0–1)
MONOCYTES NFR BLD AUTO: 3 % (ref 0–12)
MONOCYTES NFR BLD AUTO: 4 % (ref 0–12)
MONOCYTES NFR BLD: 2 %
NEUTROPHILS # BLD AUTO: 14.4 X 10^3 (ref 1.8–7.8)
NEUTROPHILS # BLD AUTO: 16.5 X 10^3 (ref 1.8–7.8)
NEUTROPHILS NFR BLD AUTO: 90 % (ref 42–75)
NEUTROPHILS NFR BLD AUTO: 93 % (ref 42–75)
NEUTS BAND NFR BLD MANUAL: 89 %
NEUTS BAND NFR BLD: 7 %
PHOSPHATE SERPL-MCNC: 4.2 MG/DL (ref 2.3–4.7)
PLATELET # BLD: 148 10^3/UL (ref 130–400)
PLATELET # BLD: 170 10^3/UL (ref 130–400)
PMV BLD AUTO: 11 FL (ref 7.4–10.4)
PMV BLD AUTO: 11.2 FL (ref 7.4–10.4)
POTASSIUM SERPL-SCNC: 3.8 MMOL/L (ref 3.6–5)
PROT SERPL-MCNC: 6.8 GM/DL (ref 6.4–8.2)
RBC MORPH BLD: NORMAL
SODIUM SERPL-SCNC: 142 MMOL/L (ref 135–145)
VARIANT LYMPHS NFR BLD MANUAL: 2 %
WBC # BLD AUTO: 15.9 10^3/UL (ref 4.3–11)
WBC # BLD AUTO: 17.8 10^3/UL (ref 4.3–11)

## 2019-12-16 PROCEDURE — 0DTN0ZZ RESECTION OF SIGMOID COLON, OPEN APPROACH: ICD-10-PCS | Performed by: SURGERY

## 2019-12-16 PROCEDURE — 0D1M0Z4 BYPASS DESCENDING COLON TO CUTANEOUS, OPEN APPROACH: ICD-10-PCS | Performed by: SURGERY

## 2019-12-16 PROCEDURE — 0DBM0ZX EXCISION OF DESCENDING COLON, OPEN APPROACH, DIAGNOSTIC: ICD-10-PCS | Performed by: SURGERY

## 2019-12-16 RX ADMIN — FENTANYL CITRATE PRN MCG: 50 INJECTION, SOLUTION INTRAMUSCULAR; INTRAVENOUS at 05:31

## 2019-12-16 RX ADMIN — SODIUM CHLORIDE SCH MLS/HR: 900 INJECTION INTRAVENOUS at 08:12

## 2019-12-16 RX ADMIN — SODIUM CHLORIDE SCH MLS/HR: 900 INJECTION, SOLUTION INTRAVENOUS at 05:31

## 2019-12-16 RX ADMIN — SODIUM CHLORIDE SCH MLS/HR: 900 INJECTION, SOLUTION INTRAVENOUS at 08:12

## 2019-12-16 RX ADMIN — FENTANYL CITRATE PRN MCG: 50 INJECTION, SOLUTION INTRAMUSCULAR; INTRAVENOUS at 08:13

## 2019-12-16 RX ADMIN — SODIUM CHLORIDE SCH MLS/HR: 900 INJECTION INTRAVENOUS at 15:42

## 2019-12-16 RX ADMIN — SODIUM CHLORIDE SCH MLS/HR: 900 INJECTION, SOLUTION INTRAVENOUS at 15:43

## 2019-12-16 RX ADMIN — FENTANYL CITRATE PRN MCG: 50 INJECTION, SOLUTION INTRAMUSCULAR; INTRAVENOUS at 15:51

## 2019-12-16 RX ADMIN — SODIUM CHLORIDE SCH MLS/HR: 900 INJECTION, SOLUTION INTRAVENOUS at 01:07

## 2019-12-16 RX ADMIN — ENOXAPARIN SODIUM SCH MG: 30 INJECTION SUBCUTANEOUS at 21:55

## 2019-12-16 RX ADMIN — ALBUTEROL SULFATE SCH MG: 2.5 SOLUTION RESPIRATORY (INHALATION) at 21:52

## 2019-12-16 RX ADMIN — FENTANYL CITRATE PRN MCG: 50 INJECTION, SOLUTION INTRAMUSCULAR; INTRAVENOUS at 12:17

## 2019-12-16 RX ADMIN — FENTANYL CITRATE PRN MCG: 50 INJECTION, SOLUTION INTRAMUSCULAR; INTRAVENOUS at 01:07

## 2019-12-16 RX ADMIN — FENTANYL CITRATE PRN MCG: 50 INJECTION, SOLUTION INTRAMUSCULAR; INTRAVENOUS at 04:01

## 2019-12-16 NOTE — DIAGNOSTIC IMAGING REPORT
INDICATION: Perforated diverticulitis.



FINDINGS: Portable upright view of the chest demonstrate some

atelectasis in the right base. Heart size is mildly enlarged with

normal vascularity. There is calcification of the aorta. Free air

is present in the abdomen.



IMPRESSION:

1. Free air is present within the abdomen. Previously there are

some pockets of air along the diverticuli.

2. Left basilar atelectasis.

3. Cardiomegaly.



Critical finding.



Report was called to Gisella/RN [facility] ICU by sandra at

7:35 am.



Dictated by: 



  Dictated on workstation # [id number]

## 2019-12-16 NOTE — NUR
This RN notified Dr. Malcolm of critical xray results below. 

IMPRESSION:

1. Free air is present within the abdomen. Previously there are

some pockets of air along the diverticuli.

2. Left basilar atelectasis.

3. Cardiomegaly.

New orders received at this time. See order hx.

## 2019-12-16 NOTE — PROGRESS NOTE-POST OPERATIVE
Post-Operative Progess Note


Surgeon (s)/Assistant (s)


Surgeon


SUNG BARR MD


Assistant:  saima wynn APRMARCELINA





Pre-Operative Diagnosis


perforated viscus





Post-Operative Diagnosis





perforated sigmoid diverticulitis.





Procedure & Operative Findings


Date of Procedure


12/16/19


Procedure Performed/Findings


exploratory laparotomy, sigmoid colon resection, fleming's pouch, end colostomy,

placement left subclavian central venous catheter.


Anesthesia Type


get





Estimated Blood Loss


Estimated blood loss (mL):  300ml





Specimens/Packing


Specimens Removed


sigmoid colon.











SUNG BARR MD                Dec 16, 2019 21:02


POS

## 2019-12-16 NOTE — NUR
Pastoral care visit, pt shared his feelings about his upcoming procedure, pts wife requested 
a  visit.  I phoned local [place] and they advised they would contact a .

## 2019-12-16 NOTE — CONSULTATION - HOSPITALIST
HPI


History of Present Illness:


HPI/Chief Complaint


Patient is an 89-year-old  male with a past medical history of 

hypertension, stroke who presented to the emergency room due to abdominal pain. 

He reports his symptoms started yesterday and complained of pain all across the 

middle of his abdomen.  He has no previous history of similar pain but he does 

have a history of diverticulosis.  His last bowel movement was yesterday.  He 

denies any fevers or chills.  He was found to have perforated bowel and was 

admitted to the surgical services.  He is to go to the operating room this 

afternoon.  I am consulted for medical management.


Source:  patient, family


Exam Limitations:  no limitations


Date Seen


19


Attending Physician


Willard Malcolm MD


PCP


Erasto Woo DO


Referring Physician





Date of Admission


Dec 15, 2019 at 15:19





Home Medications & Allergies


Home Medications


Reviewed patient Home Medication Reconciliation performed by pharmacy medication

reconciliations technician and/or nursing.


Patients Allergies have been reviewed.





Allergies





Allergies


Coded Allergies


  No Known Drug Allergies ([id number])








Past Medical-Social-Family Hx


Past Med/Social Hx:  Reviewed Nursing Past Med/Soc Hx


Patient Social History


Marrital Status:  


Employed/Student:  retired


Alcohol Use:  Denies Use


Recreational Drug Use:  No


Smoking Status:  Former Smoker


Former Smoker, Quit:  May 23, 1979


Recent Foreign Travel:  No


Contact w/other who traveled:  No


Recent Hopitalizations:  No


Recent Infectious Disease Expo:  No





Immunizations Up To Date


Date of Pneumonia Vaccine:  Dec 15, 2016


Date of Influenza Vaccine:  Sep 15, 2019





Seasonal Allergies


Seasonal Allergies:  No





Past Medical History


Surgeries:  Eye Surgery, Orthopedic


Cardiac:  Deep Vein Thrombosis, Hypertension


Neurological:  Stroke


HEENT:  Cataract


Cancer:  Skin


What Type of Treatment Did You:  Surgical Intervention





Family History


No Pertinent Family Hx





Review of Systems


Constitutional:  No chills, No fever


EENTM:  no symptoms reported


Respiratory:  No cough, No dyspnea on exertion, No phlegm; short of breath, 

wheezing


Cardiovascular:  No chest pain, No palpitations


Gastrointestinal:  see HPI, abdominal pain; No constipation


Genitourinary:  no symptoms reported


Musculoskeletal:  no symptoms reported


Skin:  no symptoms reported


Psychiatric/Neurological:  No Symptoms Reported





Physical Exam


Physical Exam


Vital Signs





Vital Signs - First Documented








 12/15/19 12/15/19





 14:02 16:00


 


Temp 37.0 


 


Pulse 62 


 


Resp 15 


 


B/P (MAP) 143/84 (103) 


 


Pulse Ox 94 


 


O2 Delivery Room Air 


 


O2 Flow Rate  2.00





Capillary Refill : Less Than 3 Seconds


Height, Weight, BMI


Height: 5'6.00"


Weight: 175lbs. oz. [id number]; 29.00 BMI


Method:Stated


General Appearance:  No Apparent Distress, WD/WN


HEENT:  PERRL/EOMI, Moist Mucous Membranes; No Scleral Icterus (L), No Scleral 

Icterus (R)


Neck:  Supple; No Thyromegaly


Respiratory:  Lungs Clear, No Accessory Muscle Use, No Respiratory Distress


Cardiovascular:  Regular Rate, Rhythm, No Murmur


Gastrointestinal:  Abnormal Bowel Sounds, Distended; No Guarding; Tenderness 

(mild)


Extremity:  No Calf Tenderness, No Pedal Edema


Neurologic/Psychiatric:  Alert, Oriented x3, Normal Mood/Affect


Skin:  Normal Color, Warm/Dry





Results


Results/Procedures


Labs


Laboratory Tests


12/15/19 14:21








12/15/19 14:47








19 04:30








19 13:15








Patient resulted labs reviewed.


Imaging:  Reviewed Imaging Report


Imaging


Date of Exam:12/15/19





CT ABD/PELVIS WO(KIDNEY STONE)








PROCEDURE: CT urinary tract, rule out kidney stone.





TECHNIQUE: Multiple contiguous axial images were obtained through


the abdomen and pelvis without the use of intravenous contrast.


Auto Exposure Controls were utilized during the CT exam to meet


ALARA standards for radiation dose reduction. 





INDICATION: Lower abdominal pain.





FINDINGS: The lung bases are clear. Heart size is normal. The


liver is normal in size and without focal lesions. Gallbladder is


unremarkable. There is no biliary ductal dilatation. Spleen is


normal. The pancreas and adrenal glands are unremarkable there is


free intraperitoneal air. This is likely secondary to perforated


diverticulitis in the upper sigmoid colon. There is some wispy


inflammatory change and fluid however no discrete fluid


collection appreciated to suggest abscess. There is moderate


atherosclerotic calcification of the aorta, which is


nonaneurysmal. There is no pelvic mass or adenopathy. There are


moderate degenerative changes in the spine.





IMPRESSION: Findings most suspect for perforated diverticulitis


as there are pockets of free air about the upper sigmoid colon.


There are inflammatory changes and some fluid although no


discrete abscess.





Assessment/Plan


Assessment and Plan


Assess & Plan/Chief Complaint


Perforated Diverticulum


   To OR per primary


   Continue Merrem


   Fentanyl for pain





HTN


   Soft while I was in the room


   Will trend





CKD


   Continue IVF and monitor UOP


   Trend





History of CVA with right side deficits


   At baseline


   Hold plavix





Diagnosis/Problems


Diagnosis/Problems





(1) Perforated diverticulum


Status:  Acute


(2) Hypertension


Qualifiers:  


   Hypertension type:  essential hypertension  Qualified Codes:  I10 - Essential

(primary) hypertension


(3) History of stroke


Status:  Chronic





Clinical Quality Measures


DVT/VTE Risk/Contraindication:


Risk Factor Score Per Nursin


RFS Level Per Nursing on Admit:  4+=Very High











EMMY SINGLETARY MD              Dec 16, 2019 15:26


POS

## 2019-12-16 NOTE — NUR
SPOKE WITH THE PATIENT ABOUT HIS MEDICATIONS. HIS WIFE HAD A LIST WITH HER AND STATES SHE 
GIVES HIM HIS MEDS DAILY. I COMPARED THE LIST WITH THE EXT MED HX. 



HE FILLED ENALAPRIL 10MG #180 FOR 90 DAYS 9-19-19 - SHE STATES HE ONLY TAKES 1 TAB DAILY IN 
THE MORNING. 



HE FILLED LIPITOR 10MG #90 9-19-19 HOWEVER HIS WIFE STATES SHE HAS NOT BEEN GIVING IT TO 
HIM. IT IS  A NIGHT TIME PILL AND THEY FORGET TO TAKE IT BUT SHE DOES NOT FEEL LIKE HE NEEDS 
IT.



HE TAKES 1 TYLENOL PRN OTC.

## 2019-12-16 NOTE — NUR
Pt is Buddhism and was anointed by Fr Rubén Navarro this morning.   provided prayer 
and blessing also later.

## 2019-12-16 NOTE — DIAGNOSTIC IMAGING REPORT
HISTORY: Central line placement



TECHNIQUE: Frontal view of the chest



COMPARISON: 12/16/2019



FINDINGS:



The tip of the left central line projects over the cavoatrial

junction. Lung volumes are low. There are bibasilar airspace

opacities. The cardiac silhouette is prominent, may be

accentuated by the low lung volumes. No pleural effusion or

pneumothorax is seen.



IMPRESSION:

1. The tip of the left central line projects over the cavoatrial

junction.

2. Bibasilar airspace opacities, may represent atelectasis given

the low lung volumes.



Dictated by: 



  Dictated on workstation # [id number]

## 2019-12-16 NOTE — PROGRESS NOTE-PRE OPERATIVE
Pre-Operative Progress Note


H&P Reviewed


The H&P was reviewed, patient examined and no changes noted.


Date Seen by Provider:  Dec 16, 2019


Time Seen by Provider:  13:00


Date H&P Reviewed:  Dec 16, 2019


Time H&P Reviewed:  13:00


Pre-Operative Diagnosis:  perforated viscus











SUNG BARR MD                Dec 16, 2019 14:58


POS

## 2019-12-16 NOTE — NUR
Pt c/o of pain 10/10 in lower abdomen, pt sitting at edge of chair cradling stomach. EICU 
notified at this time. New orders received, see order hx.

## 2019-12-17 VITALS — SYSTOLIC BLOOD PRESSURE: 108 MMHG | DIASTOLIC BLOOD PRESSURE: 51 MMHG

## 2019-12-17 VITALS — SYSTOLIC BLOOD PRESSURE: 111 MMHG | DIASTOLIC BLOOD PRESSURE: 83 MMHG

## 2019-12-17 VITALS — DIASTOLIC BLOOD PRESSURE: 59 MMHG | SYSTOLIC BLOOD PRESSURE: 103 MMHG

## 2019-12-17 VITALS — SYSTOLIC BLOOD PRESSURE: 116 MMHG | DIASTOLIC BLOOD PRESSURE: 56 MMHG

## 2019-12-17 VITALS — SYSTOLIC BLOOD PRESSURE: 129 MMHG | DIASTOLIC BLOOD PRESSURE: 64 MMHG

## 2019-12-17 VITALS — SYSTOLIC BLOOD PRESSURE: 104 MMHG | DIASTOLIC BLOOD PRESSURE: 57 MMHG

## 2019-12-17 VITALS — SYSTOLIC BLOOD PRESSURE: 156 MMHG | DIASTOLIC BLOOD PRESSURE: 78 MMHG

## 2019-12-17 VITALS — DIASTOLIC BLOOD PRESSURE: 52 MMHG | SYSTOLIC BLOOD PRESSURE: 106 MMHG

## 2019-12-17 VITALS — DIASTOLIC BLOOD PRESSURE: 56 MMHG | SYSTOLIC BLOOD PRESSURE: 118 MMHG

## 2019-12-17 VITALS — DIASTOLIC BLOOD PRESSURE: 51 MMHG | SYSTOLIC BLOOD PRESSURE: 118 MMHG

## 2019-12-17 VITALS — SYSTOLIC BLOOD PRESSURE: 126 MMHG | DIASTOLIC BLOOD PRESSURE: 52 MMHG

## 2019-12-17 VITALS — DIASTOLIC BLOOD PRESSURE: 74 MMHG | SYSTOLIC BLOOD PRESSURE: 93 MMHG

## 2019-12-17 VITALS — DIASTOLIC BLOOD PRESSURE: 100 MMHG | SYSTOLIC BLOOD PRESSURE: 125 MMHG

## 2019-12-17 VITALS — SYSTOLIC BLOOD PRESSURE: 147 MMHG | DIASTOLIC BLOOD PRESSURE: 74 MMHG

## 2019-12-17 VITALS — DIASTOLIC BLOOD PRESSURE: 47 MMHG | SYSTOLIC BLOOD PRESSURE: 107 MMHG

## 2019-12-17 VITALS — SYSTOLIC BLOOD PRESSURE: 126 MMHG | DIASTOLIC BLOOD PRESSURE: 98 MMHG

## 2019-12-17 VITALS — DIASTOLIC BLOOD PRESSURE: 55 MMHG | SYSTOLIC BLOOD PRESSURE: 96 MMHG

## 2019-12-17 VITALS — DIASTOLIC BLOOD PRESSURE: 53 MMHG | SYSTOLIC BLOOD PRESSURE: 94 MMHG

## 2019-12-17 VITALS — SYSTOLIC BLOOD PRESSURE: 86 MMHG | DIASTOLIC BLOOD PRESSURE: 54 MMHG

## 2019-12-17 VITALS — DIASTOLIC BLOOD PRESSURE: 98 MMHG | SYSTOLIC BLOOD PRESSURE: 106 MMHG

## 2019-12-17 VITALS — DIASTOLIC BLOOD PRESSURE: 60 MMHG | SYSTOLIC BLOOD PRESSURE: 108 MMHG

## 2019-12-17 VITALS — SYSTOLIC BLOOD PRESSURE: 124 MMHG | DIASTOLIC BLOOD PRESSURE: 65 MMHG

## 2019-12-17 VITALS — SYSTOLIC BLOOD PRESSURE: 118 MMHG | DIASTOLIC BLOOD PRESSURE: 95 MMHG

## 2019-12-17 LAB
BASOPHILS # BLD AUTO: 0 10^3/UL (ref 0–0.1)
BASOPHILS NFR BLD AUTO: 0 % (ref 0–10)
BUN/CREAT SERPL: 18
CALCIUM SERPL-MCNC: 7.9 MG/DL (ref 8.5–10.1)
CHLORIDE SERPL-SCNC: 109 MMOL/L (ref 98–107)
CO2 SERPL-SCNC: 19 MMOL/L (ref 21–32)
CREAT SERPL-MCNC: 1.8 MG/DL (ref 0.6–1.3)
EOSINOPHIL # BLD AUTO: 0 10^3/UL (ref 0–0.3)
EOSINOPHIL NFR BLD AUTO: 0 % (ref 0–10)
ERYTHROCYTE [DISTWIDTH] IN BLOOD BY AUTOMATED COUNT: 13.6 % (ref 10–14.5)
GFR SERPLBLD BASED ON 1.73 SQ M-ARVRAT: 36 ML/MIN
GLUCOSE SERPL-MCNC: 153 MG/DL (ref 70–105)
HCT VFR BLD CALC: 36 % (ref 40–54)
HGB BLD-MCNC: 11.3 G/DL (ref 13.3–17.7)
LYMPHOCYTES # BLD AUTO: 0.7 X 10^3 (ref 1–4)
LYMPHOCYTES NFR BLD AUTO: 5 % (ref 12–44)
MAGNESIUM SERPL-MCNC: 1.8 MG/DL (ref 1.6–2.4)
MANUAL DIFFERENTIAL PERFORMED BLD QL: NO
MCH RBC QN AUTO: 29 PG (ref 25–34)
MCHC RBC AUTO-ENTMCNC: 32 G/DL (ref 32–36)
MCV RBC AUTO: 93 FL (ref 80–99)
MONOCYTES # BLD AUTO: 0.5 X 10^3 (ref 0–1)
MONOCYTES NFR BLD AUTO: 4 % (ref 0–12)
NEUTROPHILS # BLD AUTO: 13 X 10^3 (ref 1.8–7.8)
NEUTROPHILS NFR BLD AUTO: 92 % (ref 42–75)
PHOSPHATE SERPL-MCNC: 3.7 MG/DL (ref 2.3–4.7)
PLATELET # BLD: 169 10^3/UL (ref 130–400)
PMV BLD AUTO: 11.1 FL (ref 7.4–10.4)
POTASSIUM SERPL-SCNC: 3.9 MMOL/L (ref 3.6–5)
SODIUM SERPL-SCNC: 142 MMOL/L (ref 135–145)
WBC # BLD AUTO: 14.2 10^3/UL (ref 4.3–11)

## 2019-12-17 RX ADMIN — ALBUTEROL SULFATE SCH MG: 2.5 SOLUTION RESPIRATORY (INHALATION) at 01:19

## 2019-12-17 RX ADMIN — SODIUM CHLORIDE SCH MLS/HR: 900 INJECTION, SOLUTION INTRAVENOUS at 01:10

## 2019-12-17 RX ADMIN — SODIUM CHLORIDE SCH MLS/HR: 900 INJECTION INTRAVENOUS at 00:24

## 2019-12-17 RX ADMIN — OXYCODONE HYDROCHLORIDE PRN MG: 5 SOLUTION ORAL at 18:11

## 2019-12-17 RX ADMIN — ALBUTEROL SULFATE SCH MG: 2.5 SOLUTION RESPIRATORY (INHALATION) at 22:08

## 2019-12-17 RX ADMIN — ENOXAPARIN SODIUM SCH MG: 30 INJECTION SUBCUTANEOUS at 21:04

## 2019-12-17 RX ADMIN — SODIUM CHLORIDE SCH MLS/HR: 900 INJECTION INTRAVENOUS at 23:49

## 2019-12-17 RX ADMIN — SODIUM CHLORIDE SCH MLS/HR: 900 INJECTION, SOLUTION INTRAVENOUS at 14:59

## 2019-12-17 RX ADMIN — SODIUM CHLORIDE SCH MLS/HR: 900 INJECTION INTRAVENOUS at 14:08

## 2019-12-17 RX ADMIN — SODIUM CHLORIDE SCH MLS/HR: 900 INJECTION, SOLUTION INTRAVENOUS at 23:54

## 2019-12-17 RX ADMIN — SODIUM CHLORIDE SCH MLS/HR: 900 INJECTION INTRAVENOUS at 10:45

## 2019-12-17 RX ADMIN — POTASSIUM CHLORIDE SCH MLS/HR: 200 INJECTION, SOLUTION INTRAVENOUS at 04:36

## 2019-12-17 RX ADMIN — SODIUM CHLORIDE SCH MLS/HR: 900 INJECTION, SOLUTION INTRAVENOUS at 06:10

## 2019-12-17 RX ADMIN — ALBUTEROL SULFATE SCH MG: 2.5 SOLUTION RESPIRATORY (INHALATION) at 10:05

## 2019-12-17 RX ADMIN — MAGNESIUM SULFATE IN DEXTROSE SCH MLS/HR: 10 INJECTION, SOLUTION INTRAVENOUS at 04:36

## 2019-12-17 RX ADMIN — PANTOPRAZOLE SODIUM SCH MG: 40 TABLET, DELAYED RELEASE ORAL at 10:45

## 2019-12-17 RX ADMIN — ALBUTEROL SULFATE SCH MG: 2.5 SOLUTION RESPIRATORY (INHALATION) at 14:04

## 2019-12-17 RX ADMIN — ALBUTEROL SULFATE SCH MG: 2.5 SOLUTION RESPIRATORY (INHALATION) at 07:36

## 2019-12-17 RX ADMIN — POTASSIUM CHLORIDE SCH MEQ: 1500 TABLET, EXTENDED RELEASE ORAL at 04:36

## 2019-12-17 RX ADMIN — DOCUSATE SODIUM AND SENNOSIDES SCH EA: 8.6; 5 TABLET, FILM COATED ORAL at 10:45

## 2019-12-17 RX ADMIN — OXYCODONE HYDROCHLORIDE PRN MG: 5 SOLUTION ORAL at 14:53

## 2019-12-17 NOTE — PROGRESS NOTE - HOSPITALIST
Subjective


HPI/CC On Admission


Date Seen by Provider:  Dec 17, 2019


Time Seen by Provider:  10:18


Patient is an 89-year-old  male with a past medical history of 

hypertension, stroke who presented to the emergency room due to abdominal pain. 

He reports his symptoms started yesterday and complained of pain all across the 

middle of his abdomen.  He has no previous history of similar pain but he does 

have a history of diverticulosis.  His last bowel movement was yesterday.  He 

denies any fevers or chills.  He was found to have perforated bowel and was 

admitted to the surgical services.  He is to go to the operating room this 

afternoon.  I am consulted for medical management.


Subjective/Events-last exam


Pt reports feeling ok today. No abdominal pain. Some shortness of breath. Family

thinks he is anxious but he declines.





Objective


Exam


Vital Signs





Vital Signs








  Date Time  Temp Pulse Resp B/P (MAP) Pulse Ox O2 Delivery O2 Flow Rate FiO2


 


19 10:05     95 Nasal Cannula 4.00 


 


19 06:00  94 8 94/53 (67)    


 


19 03:23 36.5       





Capillary Refill : Less Than 3 SecondsLess Than 3 Seconds


General Appearance:  No Apparent Distress, WD/WN, Anxious


Respiratory:  Lungs Clear, No Respiratory Distress


Cardiovascular:  Regular Rate, Rhythm, No Murmur


Gastrointestinal:  Soft, Abnormal Bowel Sounds (quiet), Other (colostomy in 

place)


Neurologic/Psychiatric:  Alert, Oriented x3





Results/Procedures


Lab


Laboratory Tests


19 13:15








19 03:02








19 03:12








Patient resulted labs reviewed.


Imaging:  Reviewed Imaging Report





Assessment/Plan


Assessment and Plan


Assess & Plan/Chief Complaint


Perforated Diverticulum


   POD #1 s/p resection and colostomy


   Continue Merrem


   Fentanyl for pain





HTN


   Soft again today- hold home meds





CKD


   Continue IVF


   UOP adequate


   Creatine essentially stable but up slightly


   Trend





History of CVA with right side deficits


   At baseline


   Hold plavix





Anxiety


   Ativan added prn as NPO





Diagnosis/Problems


Diagnosis/Problems





(1) Perforated diverticulum


Status:  Acute


(2) Hypertension


Qualifiers:  


   Hypertension type:  essential hypertension  Qualified Codes:  I10 - Essential

(primary) hypertension


(3) History of stroke


Status:  Chronic





Clinical Quality Measures


DVT/VTE Risk/Contraindication:


Risk Factor Score Per Nursin


RFS Level Per Nursing on Admit:  4+=Very High











HAYDER,EMMY M MD              Dec 17, 2019 10:23


POS

## 2019-12-17 NOTE — DIAGNOSTIC IMAGING REPORT
EXAMINATION:

Portable erect AP chest at 3:29 AM. 



INDICATION: 

Bowel perforation.



FINDINGS:

As on prior exam of 12/16/2019, there is a shallow inspiration.

Even allowing for this technical factor, the heart has diminished

in size. The previous exam did note bibasilar airspace opacities.

There is still a small amount of atelectasis/infiltrate in the

right lung base. The left lung base seems generally clear as do

the upper lobes. The mediastinum is not widened. The osseous

structures are intact. The left-sided PICC line is stable in

position.



IMPRESSION: 

The appearance of the chest has improved somewhat since the prior

exam as the heart does seem less prominent and the lungs are

slightly better aerated.



Dictated by: 



  Dictated on workstation # [id number]

## 2019-12-17 NOTE — ANESTHESIA-GENERAL POST-OP
General


Patient Condition


Mental Status/LOC:  Same as Preop


Cardiovascular:  Satisfactory


Nausea/Vomiting:  Absent


Respiratory:  Satisfactory


Pain:  Controlled


Complications:  Absent





Post Op Complications


Complications


None





Follow Up Care/Instructions


Patient Instructions


None needed.





Anesthesia/Patient Condition


Patient Condition


Patient is sitting up in his chair and doing well, no complaints, stable vital 

signs, no apparent adverse anesthesia problems.











EDWARD PIMENTEL DO         Dec 17, 2019 16:43


POS

## 2019-12-17 NOTE — NUR
This RN notified EICU that patient's telemetry shows intermittent afib and SR. Stat EKG 
obtained per protocol, results sent to EICU.

## 2019-12-17 NOTE — NUR
Visit took place with the pt, his wife, and daughter. They requested Fr. Brown be 
notified of pt's hospital admission as they will not be home this Friday when Fr. Brown 
is scheduled to bring them communion. Notified the .

## 2019-12-17 NOTE — PROGRESS NOTE
Subjective


Date Seen by a Provider:  Dec 17, 2019


Time Seen by a Provider:  12:00


Subjective/Events-last exam


doing ok. up in chair. pain controlled. no bowel function yet per colostomy.





Objective


Exam





Vital Signs








  Date Time  Temp Pulse Resp B/P (MAP) Pulse Ox O2 Delivery O2 Flow Rate FiO2


 


19 14:04     96 Nasal Cannula 3.00 


 


19 12:00 37.0       


 


19 10:42      Nasal Cannula 2.00 


 


19 10:05     95 Nasal Cannula 4.00 


 


19 10:00  101 28 103/59 (74) 95 OxyMask 6.00 


 


19 09:00  104 11 86/54 (65) 95 OxyMask 6.00 


 


19 08:00  98 13 93/74 (80) 94 OxyMask 6.00 


 


19 07:36     95 Nasal Cannula 4.00 


 


19 07:00  93 11 126/52 (76) 95 OxyMask 6.00 


 


19 06:44  94      


 


19 06:00  94 8 94/53 (67) 96 OxyMask 6.00 


 


19 05:00  87 8 96/55 (69) 96 OxyMask 6.00 


 


19 04:00  88 13 104/57 (73) 96 OxyMask 6.00 


 


19 04:00      OxyMask 5.00 


 


19 03:23 36.5       


 


19 03:00  94 7 108/60 (76) 94 OxyMask 6.00 


 


19 02:00  90 8 107/47 (67) 95 OxyMask 6.00 


 


19 01:20     96 OxyMask 5.00 


 


19 01:00  96 16 118/51 (73) 96 OxyMask 6.00 


 


19 01:00  96      


 


19 00:00  93 17 106/98 (101) 97 OxyMask 6.00 


 


19 00:00      OxyMask 5.00 


 


19 00:00 36.4       


 


19 23:00  90 10 130/56 (80) 92 OxyMask 6.00 


 


19 22:15 36.8  14 142/73 (96) 93 OxyMask 4 


 


19 22:15      OxyMask 4 


 


19 22:10   14 140/77 (98) 94 OxyMask 6 


 


19 22:10      OxyMask 4 


 


19 22:00  100 31 171/104 (126) 91 OxyMask 6.00 


 


19 22:00   18 171/104 (126) 90 OxyMask 6 


 


19 21:55      OxyMask 6 


 


19 21:52     95 OxyMask 6.00 


 


19 21:50   14 174/71 (105) 92 OxyMask 6 


 


19 21:45  100 31 150/100 (117) 91 OxyMask 6.00 


 


19 21:40   26 134/114 (121) 93 OxyMask 6 


 


19 21:40      OxyMask 6 


 


19 21:30   24 184/104 (130) 90 OxyMask 10 


 


19 21:30  108 22 184/104 (130) 90 OxyMask 6.00 


 


19 21:28      OxyMask 10 


 


19 21:28 36.2  24 184/102 (129) 92 OxyMask 10 


 


19 18:59  85      


 


19 18:00  96 23 128/73 (91) 94 Nasal Cannula 2.00 


 


19 17:00  91 28 139/58 (85) 95 Nasal Cannula 2.00 


 


19 16:00 37.4       


 


19 16:00  90 15 120/51 (74) 94 Nasal Cannula 2.00 


 


19 16:00      Nasal Cannula 2.00 


 


19 15:30 37.2 93 19 125/96 95 Nasal Cannula 2.00 


 


19 15:00  93 26 104/61 (75) 95 Nasal Cannula 2.00 














I & O 


 


 19





 07:00


 


Intake Total 1000 ml


 


Output Total 1275 ml


 


Balance -275 ml





Capillary Refill : Less Than 3 SecondsLess Than 3 Seconds


General Appearance:  No Apparent Distress


HEENT:  PERRL/EOMI


Neck:  Full Range of Motion


Respiratory:  Chest Non Tender, Decreased Breath Sounds


Cardiovascular:  Regular Rate, Rhythm


Gastrointestinal:  soft, tenderness


Extremity:  Normal Capillary Refill


Neurologic/Psychiatric:  Alert, Oriented x3


Skin:  Normal Color


Lymphatic:  No Adenopathy





Results


Lab


Laboratory Tests


19 03:02: 


White Blood Count 14.2H, Red Blood Count 3.86L, Hemoglobin 11.3L, Hematocrit 36L

, Mean Corpuscular Volume 93, Mean Corpuscular Hemoglobin 29, Mean Corpuscular 

Hemoglobin Concent 32, Red Cell Distribution Width 13.6, Platelet Count 169, 

Mean Platelet Volume 11.1H, Neutrophils (%) (Auto) 92H, Lymphocytes (%) (Auto) 

5L, Monocytes (%) (Auto) 4, Eosinophils (%) (Auto) 0, Basophils (%) (Auto) 0, 

Neutrophils # (Auto) 13.0H, Lymphocytes # (Auto) 0.7L, Monocytes # (Auto) 0.5, 

Eosinophils # (Auto) 0.0, Basophils # (Auto) 0.0


19 03:12: 


Sodium Level 142, Potassium Level 3.9, Chloride Level 109H, Carbon Dioxide Level

19L, Anion Gap 14, Blood Urea Nitrogen 33H, Creatinine 1.80H, Estimat Glomerular

Filtration Rate 36, BUN/Creatinine Ratio 18, Glucose Level 153H, Calcium Level 

7.9L, Phosphorus Level 3.7, Magnesium Level 1.8





Microbiology


12/15/19 MRSA Screen - Final, Complete





Assessment/Plan


Assessment/Plan


Assess & Plan/Chief Complaint


perforated sigmoid diverticulitis s/p sigmoid colectomy, end colostomy, 

hartmans.


continue ambulation.


hematuria secondary to hx BPH and placement conner. 


start PO pain meds and decrease IV.





Clinical Quality Measures


DVT/VTE Risk/Contraindication:


Risk Factor Score Per Nursin


RFS Level Per Nursing on Admit:  4+=Very High











SUNG BARR MD                Dec 17, 2019 14:34


POS

## 2019-12-17 NOTE — NUR
This RN notified Dr. Malcolm of patient's decreased and concentrated urine output of 40ml over 
past three hours as well as continued SBP in 90's .New orders received. See order hx.

## 2019-12-17 NOTE — NUR
This RN called EICU, pt becoming increasingly anxious stating "I want to get the hell out of 
here", "I am not comfortable", despite multiple attempts to reposition by this RN. Pt's wife 
and daughter at bedside stating "This isn't like him. He seems like he is in pain. He stated 
his back also hurts". This was reported to Dr. White with EICU. New order received, see 
order hx. Will continue to monitor.

## 2019-12-17 NOTE — OPERATIVE REPORT
DATE OF SERVICE:  12/16/2019



ATTENDING PRIMARY CARE PHYSICIAN:

Erasto Woo DO



PREOPERATIVE DIAGNOSIS:

Perforated viscus.



POSTOPERATIVE DIAGNOSIS:

Perforated sigmoid diverticulitis.



PROCEDURES:

Exploratory laparotomy, resection sigmoid colon, Anu's pouch, end

colostomy, placement of left subclavian central venous catheter.



SURGEON:

Willard Malcolm MD.



ASSISTANT:

ALEXSANDER Flaherty.



ANESTHESIA:

General endotracheal.



ESTIMATED BLOOD LOSS:

300 mL.



FINDINGS:

Perforated sigmoid diverticulitis at the mid portion of the sigmoid colon, which

had been walled off by mesentery as well as small bowel; however, there was some

purulent drainage.  Severe diverticulosis of the sigmoid as well as descending

colon, the remainder of the small bowel, colon, stomach, liver and gallbladder

appeared normal.



DISPOSITION:

The patient tolerated the procedure well.



INDICATIONS:

The patient is an 89-year-old male, who presented to the Emergency Department

with suprapubic pain, which started yesterday morning.  He does not report ever

having these symptoms before in the past.  He stated that the pain persisted and

worsened over time and presented to the Emergency Department.  A CT scan was

performed, which did show what appeared to be perforated sigmoid diverticulosis;

however, no identifiable abscess or fluid collection.  At that time, the air

looked relatively small and his white count was closer within normal limits.



The patient was admitted, started on IV antibiotics.  Over the next day, the

patient states that he developed worsening abdominal pain and a followup chest

x-ray was performed, which did show increased free air.  His white count also

elevated to 17,000.



DESCRIPTION OF PROCEDURE:

The patient was brought to the operating room, laid supine on the table.  After

adequate IV pain and sedative medications and general endotracheal intubation,

the neck and chest were prepped and draped in standard surgical fashion.  We

then proceeded with placement of left subclavian central venous catheter.  The

left subclavian vein was then cannulated with drawing of venous blood.  The

guidewire was then inserted.  The cannulating needle removed and a skin incision

made using 11 blade.  A tract was then created using a venous dilator.  A triple

lumen central venous catheter was then placed over the guidewire using the

Seldinger technique.  The guidewire was then removed.  All three ports ibis

venous blood and saline pushed in without any resistance.  The catheter was then

sutured to the skin using 3-0 silk interrupted sutures.  The catheter was then

covered with a sterile Op-Site.



The abdomen was then prepped and draped in standard surgical fashion.  A midline

laparotomy incision, which was supraumbilical was then made using a 15 blade. 

With the fascia, a peritoneal lining was then opened under direct visualization

using Metzenbaum scissors entering the peritoneal cavity.  There was free air

identified.  We then proceeded to open up the fascia and peritoneal lining to

the length of the skin incision under direct visualization using cautery.



We then proceeded with 4-quadrant abdominal exploration.  There was a pelvic

abscess identified near the mid portion of the sigmoid colon, which appeared to

have been walled off by small bowel loops of small bowel as well as omentum. 

This was hard and consistent with an acute diverticulitis with perforation.  The

remainder of the left colon was palpated and there was significant

diverticulosis identified; however, no thickness to identify any active

diverticulitis.  We then ran the entire small bowel.  There were no lesions

identified there.  The remainder of the colon appeared normal as well.  The

liver, gallbladder, stomach and omentum appeared normal as well.



We then proceeded with mobilization of the sigmoid colon taken down the lateral

white lines of Toldt using Sonicision.  The mesentery was then opened using

Sonicision and dissected down to the close to the peritoneal reflection where

the rectum appeared normal with no thickness.  We then proceeded with a cephalad

dissection of the mesentery until an area of that was relatively clear of

diverticulosis was identified of the mid portion of the descending colon.  We

then proceeded with transection and stapling of the rectum using a TA contour

stapler with a 4.5 mm load.  The descending colon was then stapled using a SEBAS

75 mm stapler with a blue load.  All 4 quadrants of the abdomen were then

copiously irrigated and suctioned out until clear.  A 19-Pashto David-Costa

drain was then placed into the pelvis and brought out the right upper abdominal

quadrant.



A colostomy site was marked before the surgery and the skin was opened using

cautery and the subcutaneous tissue as well as the fascia was then dissected

using electrocautery.  The peritoneal lining was then opened with Metzenbaum

scissors and bluntly dissected open to the size of the skin opening.  The end of

the colon was then placed through this and Peter was placed on the end to hold

it in place.



The fascia and peritoneal lining were then closed using a #1 looped PDS suture

starting superiorly and inferiorly and tied in the middle.  The subcutaneous

tissue was then reapproximated using 3-0 Vicryl interrupted sutures.  Skin was

closed using skin staples.



DICTATION ENDS HERE





Job ID: [id number]

DocumentID: [id number]

Dictated Date:  12/16/2019 21:14:46

Transcription Date: 12/17/2019 05:08:07

Dictated By: WILLARD MALCOLM MD

[facility]

## 2019-12-17 NOTE — OPERATIVE REPORT
DATE OF SERVICE:  12/16/2019



CONTINUATION





The colostomy was then matured.  The staple line was then opened using 15 blade.

 We then proceeded with maturation of the end colostomy using full-thickness

bites along the colon and to the subcutaneous tissue of the skin concentrically

until completely opened and sealed.  This was then covered with a colostomy bag.



The patient tolerated the procedure well.  We will admit him back to the ICU. 

We will get a chest x-ray to confirm placement of the central venous catheter

and proceed with pain control with a PCA pump as well as proceed with DVT

prophylaxis with early ambulation, calf SCDs as well as Lovenox starting

tomorrow.  We will await bowel function and start a clear liquid diet and

advance as tolerated.  We will continue with IV antibiotics as well.





Job ID: [id number]

DocumentID: [id number]

Dictated Date:  12/16/2019 21:17:35

Transcription Date: 12/17/2019 05:12:12

Dictated By: SUNG BARR MD

## 2019-12-18 VITALS — DIASTOLIC BLOOD PRESSURE: 70 MMHG | SYSTOLIC BLOOD PRESSURE: 151 MMHG

## 2019-12-18 VITALS — DIASTOLIC BLOOD PRESSURE: 78 MMHG | SYSTOLIC BLOOD PRESSURE: 103 MMHG

## 2019-12-18 VITALS — SYSTOLIC BLOOD PRESSURE: 157 MMHG | DIASTOLIC BLOOD PRESSURE: 81 MMHG

## 2019-12-18 VITALS — SYSTOLIC BLOOD PRESSURE: 153 MMHG | DIASTOLIC BLOOD PRESSURE: 75 MMHG

## 2019-12-18 VITALS — SYSTOLIC BLOOD PRESSURE: 142 MMHG | DIASTOLIC BLOOD PRESSURE: 80 MMHG

## 2019-12-18 VITALS — SYSTOLIC BLOOD PRESSURE: 137 MMHG | DIASTOLIC BLOOD PRESSURE: 62 MMHG

## 2019-12-18 VITALS — DIASTOLIC BLOOD PRESSURE: 85 MMHG | SYSTOLIC BLOOD PRESSURE: 151 MMHG

## 2019-12-18 VITALS — DIASTOLIC BLOOD PRESSURE: 70 MMHG | SYSTOLIC BLOOD PRESSURE: 177 MMHG

## 2019-12-18 VITALS — SYSTOLIC BLOOD PRESSURE: 153 MMHG | DIASTOLIC BLOOD PRESSURE: 87 MMHG

## 2019-12-18 VITALS — DIASTOLIC BLOOD PRESSURE: 79 MMHG | SYSTOLIC BLOOD PRESSURE: 151 MMHG

## 2019-12-18 VITALS — DIASTOLIC BLOOD PRESSURE: 89 MMHG | SYSTOLIC BLOOD PRESSURE: 163 MMHG

## 2019-12-18 LAB
BASOPHILS # BLD AUTO: 0 10^3/UL (ref 0–0.1)
BASOPHILS NFR BLD AUTO: 0 % (ref 0–10)
BUN/CREAT SERPL: 21
CALCIUM SERPL-MCNC: 7.7 MG/DL (ref 8.5–10.1)
CHLORIDE SERPL-SCNC: 110 MMOL/L (ref 98–107)
CO2 SERPL-SCNC: 21 MMOL/L (ref 21–32)
CREAT SERPL-MCNC: 1.6 MG/DL (ref 0.6–1.3)
EOSINOPHIL # BLD AUTO: 0 10^3/UL (ref 0–0.3)
EOSINOPHIL NFR BLD AUTO: 0 % (ref 0–10)
ERYTHROCYTE [DISTWIDTH] IN BLOOD BY AUTOMATED COUNT: 13.4 % (ref 10–14.5)
GFR SERPLBLD BASED ON 1.73 SQ M-ARVRAT: 41 ML/MIN
GLUCOSE SERPL-MCNC: 125 MG/DL (ref 70–105)
HCT VFR BLD CALC: 31 % (ref 40–54)
HGB BLD-MCNC: 9.7 G/DL (ref 13.3–17.7)
LYMPHOCYTES # BLD AUTO: 0.8 X 10^3 (ref 1–4)
LYMPHOCYTES NFR BLD AUTO: 8 % (ref 12–44)
MAGNESIUM SERPL-MCNC: 1.8 MG/DL (ref 1.6–2.4)
MANUAL DIFFERENTIAL PERFORMED BLD QL: NO
MCH RBC QN AUTO: 29 PG (ref 25–34)
MCHC RBC AUTO-ENTMCNC: 31 G/DL (ref 32–36)
MCV RBC AUTO: 94 FL (ref 80–99)
MONOCYTES # BLD AUTO: 0.5 X 10^3 (ref 0–1)
MONOCYTES NFR BLD AUTO: 5 % (ref 0–12)
NEUTROPHILS # BLD AUTO: 9 X 10^3 (ref 1.8–7.8)
NEUTROPHILS NFR BLD AUTO: 88 % (ref 42–75)
PHOSPHATE SERPL-MCNC: 2.1 MG/DL (ref 2.3–4.7)
PLATELET # BLD: 154 10^3/UL (ref 130–400)
PMV BLD AUTO: 10.8 FL (ref 7.4–10.4)
POTASSIUM SERPL-SCNC: 3.8 MMOL/L (ref 3.6–5)
SODIUM SERPL-SCNC: 142 MMOL/L (ref 135–145)
WBC # BLD AUTO: 10.3 10^3/UL (ref 4.3–11)

## 2019-12-18 RX ADMIN — SODIUM CHLORIDE SCH MLS/HR: 900 INJECTION INTRAVENOUS at 16:57

## 2019-12-18 RX ADMIN — ALBUTEROL SULFATE SCH MG: 2.5 SOLUTION RESPIRATORY (INHALATION) at 07:29

## 2019-12-18 RX ADMIN — DOCUSATE SODIUM AND SENNOSIDES SCH EA: 8.6; 5 TABLET, FILM COATED ORAL at 08:32

## 2019-12-18 RX ADMIN — OXYCODONE HYDROCHLORIDE PRN MG: 5 SOLUTION ORAL at 04:48

## 2019-12-18 RX ADMIN — POTASSIUM CHLORIDE SCH MLS/HR: 200 INJECTION, SOLUTION INTRAVENOUS at 06:02

## 2019-12-18 RX ADMIN — ALBUTEROL SULFATE SCH MG: 2.5 SOLUTION RESPIRATORY (INHALATION) at 15:03

## 2019-12-18 RX ADMIN — SODIUM CHLORIDE SCH MLS/HR: 900 INJECTION INTRAVENOUS at 08:31

## 2019-12-18 RX ADMIN — APIXABAN SCH MG: 2.5 TABLET, FILM COATED ORAL at 20:17

## 2019-12-18 RX ADMIN — POTASSIUM CHLORIDE SCH MEQ: 1500 TABLET, EXTENDED RELEASE ORAL at 06:03

## 2019-12-18 RX ADMIN — ALBUTEROL SULFATE SCH MG: 2.5 SOLUTION RESPIRATORY (INHALATION) at 10:08

## 2019-12-18 RX ADMIN — SODIUM CHLORIDE SCH MLS/HR: 900 INJECTION, SOLUTION INTRAVENOUS at 17:41

## 2019-12-18 RX ADMIN — SODIUM CHLORIDE SCH MLS/HR: 900 INJECTION, SOLUTION INTRAVENOUS at 08:31

## 2019-12-18 RX ADMIN — MAGNESIUM SULFATE IN DEXTROSE SCH MLS/HR: 10 INJECTION, SOLUTION INTRAVENOUS at 06:02

## 2019-12-18 RX ADMIN — SODIUM CHLORIDE SCH MLS/HR: 900 INJECTION INTRAVENOUS at 23:45

## 2019-12-18 RX ADMIN — PANTOPRAZOLE SODIUM SCH MG: 40 TABLET, DELAYED RELEASE ORAL at 08:32

## 2019-12-18 RX ADMIN — PROPRANOLOL HYDROCHLORIDE SCH MG: 20 TABLET ORAL at 16:45

## 2019-12-18 RX ADMIN — LORAZEPAM PRN MG: 2 INJECTION INTRAMUSCULAR; INTRAVENOUS at 20:17

## 2019-12-18 NOTE — CONSULTATION-CARDIOLOGY
HPI-Cardiology


Cardiology Consultation:


Date of Consultation


19


Date of Admission





Attending Physician


Willard Malcolm MD


Admitting Physician


Erasto Woo DO


Consulting Physician


GOMEZ LOPEZ MD





HPI:


Time Seen by a Provider:  09:00


Chief Complaint:


Tachycardia


This is a 89-year-old gentleman who has history of hypertension, CVA.  He 

presented to the ER with abdominal pain.  He was diagnosed with perforated bowel

and went to the operating room for surgery.  Surgery was done on 2019.  

This morning he was noted to be tachycardic and therefore cardiology was 

consulted.  He denies significant palpitations but does complain of mild 

shortness of breath as well as abdominal discomfort.  He denies active smoking. 

He denies any significant past family history of premature CAD.  He denies any 

significant past cardiac history.  He denies syncope, near-syncope, lower 

extremity swelling.





Review of Systems-Cardiology


Review of Systems


Constitutional:  As described under HPI; No As described under HPI, No no 

symptoms reported, No chills, No fever, No lightheadedness


Eyes:  No As described under HPI, No no symptoms reported, No blindness, No rafa

rred vision, No contact lenses, No drainage, No decreased acuity, No foreign 

body sensation, No pain, No vision change


Ears/Nose/Throat:  No As described under HPI, No no symptoms reported, No 

chronic hearing loss, No ear discharge, No ear pain, No nasal drainage, No 

ulcerations


Respiratory:  No no symptoms reported; As described under HPI; No As described 

under HPI, No cough, No orthopnea; shortness of breath; No SOB with excertion


Cardiovascular:  No no symptoms reported; As described under HPI; No As 

described under HPI, No chest pain, No edema, No irregular heart rate, No 

lightheadedness, No palpitations


Gastrointestinal:  No no symptoms reported, No As described under HPI, No 

abdomen distended, No abdominal pain, No blood streaked bowels, No constipation,

No diarrhea, No nausea, No vomiting, No stool coloration changes


Genitourinary:  No As described under HPI, No burning, No dysuria, No discharge,

No frequency, No flank pain, No hematuria, No urgency


Skin:  No rash, No skin related problems, No ulcerations


Psychiatric/Neurological:  No anxiety, No depression, No seizure, No focal 

weakness, No syncope


Hematologic:  No bleeding abnormalities





PMH-Social-Family Hx


Patient Social History


Marrital Status:  


Employed/Student:  retired


Alcohol Use:  Denies Use


Recreational Drug Use:  No


Smoking Status:  Former Smoker


Recent Foreign Travel:  No


Recent Infectious Disease Expo:  No


Hospitalization with Isolation:  Denies





Immunizations Up To Date


Date of Pneumonia Vaccine:  Dec 15, 2016


Date of Influenza Vaccine:  Sep 15, 2019





Past Medical History


PMH


As described under Assessment.





Allergies and Home Medications


Allergies


Coded Allergies:  


     No Known Drug Allergies (Unverified , 18)





Home Medications


Acetaminophen 500 Mg Tablet, 500 MG PO Q4H PRN for PAIN-MILD (1-4), (Reported)


Clopidogrel Bisulfate 75 Mg Tablet, 75 MG PO DAILY, (Reported)


Enalapril Maleate 10 Mg Tablet, 10 MG PO DAILY, (Reported)


Hydrochlorothiazide 25 Mg Tablet, 25 MG PO DAILY, (Reported)


Propranolol HCl 80 Mg Tablet, 160 MG PO DAILY, (Reported)


   TAKES 2 (80MG) TABLETS 


Terazosin HCl 5 Mg Capsule, 5 MG PO DAILY, (Reported)





Patient Home Medication List


Home Medication List Reviewed:  Yes





Physical Exam-Cardiology


Physical Exam


Vital Signs/I&O











 19





 02:00 03:00 04:00 04:00


 


Pulse 106 104  96


 


Resp  12  


 


B/P (MAP) 151/70 (97) 151/85 (107)  153/87 (109)


 


Pulse Ox 93 93  94


 


O2 Delivery Nasal Cannula Nasal Cannula Nasal Cannula Nasal Cannula


 


O2 Flow Rate 2.00 2.00 2.00 2.00





 19





 05:00 06:00 06:25 06:44


 


Pulse 97 94  112


 


B/P (MAP) 163/89 (113) 142/80 (100)  


 


Pulse Ox 92 94 94 


 


O2 Delivery Nasal Cannula Nasal Cannula Nasal Cannula 


 


O2 Flow Rate 2.00 2.00 3.00 





 19





 07:00 07:00 08:00 08:00


 


Temp    36.9


 


Pulse 108  115 


 


Resp  20  


 


B/P (MAP) 151/79 (103)  103/78 (86) 


 


Pulse Ox 94   


 


O2 Delivery Nasal Cannula  Nasal Cannula 


 


O2 Flow Rate 2.00  2.00 


 


    





 1219 





 08:00 08:55 10:11 


 


Temp  37.2  


 


Pulse  107  


 


B/P (MAP)  177/70 (105)  


 


Pulse Ox  92 90 


 


O2 Delivery Nasal Cannula Nasal Cannula Nasal Cannula 


 


O2 Flow Rate 2.00 2.00 3.00 














 19





 00:00


 


Intake Total 390 ml


 


Output Total 745 ml


 


Balance -355 ml





Capillary Refill : Less Than 3 SecondsLess Than 3 Seconds


Constitutional:  appears stated age, AAO x 3; No apparent distress; well-

developed, well-nourished


HEENT:  PERRL; No discharge; hearing is well preserved, oral hygience is good; 

No ulceration, No xanthelasmas are seen


Neck:  No carotid bruit; carotid pulses are 2 + bilaterally


Respiratory:  chest is bilaterally symmetric, lungs clear to auscultation


Cardiovascular:  irregularly irregular, tachycardia, S1 and S2


Gastrointestinal:  distended, tenderness; No spleenomegaly


Rectal:  deferred


Extremities:  normal range of motion, non-tender, normal inspection; No 

clubbing, No cyanosis; no lower extremity edema bilateral; No significant edema


Neurologic/Psychiatric:  no motor/sensory deficits, alert, normal mood/affect, 

oriented x 3, power is 5/5 both on sides


Skin:  normal color; No rash, No ulcerations





Data Review


Labs


Laboratory Tests


19 03:30: 


White Blood Count 10.3, Red Blood Count 3.30L, Hemoglobin 9.7L, Hematocrit 31L, 

Mean Corpuscular Volume 94, Mean Corpuscular Hemoglobin 29, Mean Corpuscular H

emoglobin Concent 31L, Red Cell Distribution Width 13.4, Platelet Count 154, 

Mean Platelet Volume 10.8H, Neutrophils (%) (Auto) 88H, Lymphocytes (%) (Auto) 

8L, Monocytes (%) (Auto) 5, Eosinophils (%) (Auto) 0, Basophils (%) (Auto) 0, 

Neutrophils # (Auto) 9.0H, Lymphocytes # (Auto) 0.8L, Monocytes # (Auto) 0.5, 

Eosinophils # (Auto) 0.0, Basophils # (Auto) 0.0, Sodium Level 142, Potassium 

Level 3.8, Chloride Level 110H, Carbon Dioxide Level 21, Anion Gap 11, Blood 

Urea Nitrogen 33H, Creatinine 1.60H, Estimat Glomerular Filtration Rate 41, 

BUN/Creatinine Ratio 21, Glucose Level 125H, Calcium Level 7.7L, Phosphorus 

Level 2.1L, Magnesium Level 1.8





Microbiology


12/15/19 MRSA Screen - Final, Complete





ECG Impression


ECG


Initial ECG Impression:  Atrial Fibrillation w/RVR





A/P-Cardiology


Assessment/Admission Diagnosis


Perforated bowel, status post surgery 2019,


Anemia,


Chronic kidney disease,


Atrial fibrillation with rapid ventricular rate,


Mild shortness of breath





Plan


Perforated bowel, status post surgery 2019,





Anemia,





Chronic kidney disease, on IV fluids.





Atrial fibrillation with rapid ventricular rate, oral anticoagulation is 

recommended.  Pharmacy to dose Eliquis.  Cardizem for rate control.





Mild shortness of breath, add BNP to morning labs.  Echocardiogram.








Thank you for your consultation. Please call me if you have any questions.








NAMAN Lopez MD, FACP, FACC, FSCAI, FHRS, CCDS


Interventional Cardiology


Cardiac Electrophysiology


Vascular Medicine and Endovascular Interventions





Clinical Quality Measures


DVT/VTE Risk/Contraindication:


Risk Factor Score Per Nursin


RFS Level Per Nursing on Admit:  4+=Very High











GOMEZ LOPEZ MD             Dec 18, 2019 11:29

## 2019-12-18 NOTE — PHYSICAL THERAPY EVALUATION
PT Evaluation-General


Medical Diagnosis


Admission Date


Dec 15, 2019 at 15:19


Medical Diagnosis:  sigmoid colon perforation


Onset Date:  Dec 16, 2019





Therapy Diagnosis


Therapy Diagnosis:  weakness





Height/Weight


Height (Feet):  5


Height (Inches):  6.00


Weight (Pounds):  175





Precautions


Precautions/Isolations:  Fall Prevention, Standard Precautions





Weight Bear Status


Right Lower Extremity:  Right


Weight Bearing/Tolerated


Left Lower Extremity:  Left


Weight Bearing/Tolerated





Referral


Physician:  Sierra Newton MD


Reason for Referral:  Evaluation/Treatment





Medical History


Pertinent Medical History:  CVA, HTN


Current History


Perforated bowel post sigmoid colectomy with colestomy


Reviewed History:  Yes





Social History


Home:  Single Level


Current Living Status:  Spouse


Entry Into Home:  Stairs With Railing


PT Steps Into Home:  5


PT Steps Inside Home:  0





Prior


Prior Level of Function


SCALE: Activities may be completed with or without assistive devices.





6-Indepedent-patient completes the activity by him/herself with no assistance 

from a helper.


5-Set-up or Clean-up Assistance-helper sets up or cleans up; patient completes 

activity. [place] assists only prior to or  


    following the activity.


4-Supervision or Touching Assistance-helper provides verbal cues and/or 

touching/steadying and/or contact guard assistance as patient completes ac

tivity. Assistance may be provided   


    throughout the activity or intermittently.


3-Partial/Moderate Assistance-helper does LESS THAN HALF the effort. [place] 

lifts, holds or supports trunk or limbs, but provides less than half the effort.


2-Substantial/Maximal Assistance-helper does MORE THAN HALF the effort. [place] 

lifts or holds trunk or limbs and provides more than half the effort.


0-Qvpoqrvqf-yklxgw does ALL the effort. Patient does none of the effort to 

complete the activity. Or, the assistance of 2 or more helpers is required for 

the patient to complete the  


    activity.


If activity was not attempted, code reason:


7-Patient Refused.


9-Not Applicable-not attempted and the patient did not perform the activity 

before the current illness, exacerbation or injury.


10-Not Attempted due to Environmental Limitations-(lack of equipment, weather 

restraints, etc.).


88-Not Attempted due to Medical Conditions or Safety Concerns.


Bed Mobility:  6


Transfers (B,C,W/C):  6


Gait:  6


Indoor Mobility (Ambulation):  Independent


Stairs:  Needed Some Help


Prior Devices Use:  Walker


Light assist with ADL's; doesnt drive.





PT Evaluation-Current


Subjective


Agrees to PT.  Family present





Objective


Patient Orientation:  Person, Place, Time, Situation





ROM/Strength


ROM Lower Extremities


WFL


Strength Lower Extremities


B LE strength grossly 4/5





Integumentary/Posture


Integumentary


intact


Bowel Incontinence:  No


Bladder Incontinence:  Luther Cath


Posture


slightly rounded shoulders





Neuromuscular


(Tone, Coordination, Reflexes)


intact and functional





Sensory


Vision:  Functional


Hearing:  Functional


Hand Dominance:  Right


Sensation Right Lower Extremit:  Intact


Sensation Left Lower Extremity:  Intact





Transfers


Roll Left to Right (QC):  2


Sit to Lying (QC):  0


Lying to Sitting/Side of Bed(Q:  2


Sit to Stand (QC):  3


Chair/Bed-to-Chair Xfer(QC):  3


Car Transfer (QC):  88


Max to sit up EOB with cues for sequencing.  min assist to stand up; pt took 4-5

steps to the chair with FWW with min assist





Gait


Does the Patient Walk?:  Yes


Mode of Locomotion:  Walk


Anticipated Mode of Locomotion:  Walk


Walk 10 feet (QC):  88


Walk 50 ft with 2 Turns(QC):  88


Walk 150 ft (QC):  88


Walking 10ft/uneven surface-QC:  88


Gait Assistive Device:  FWW





Wheelchair Training


Does the Pt Use a Wheelchair?:  No


Wheel 50 ft with 2 turns (QC):  9


Wheel 150 ft (QC):  9


Type of Wheelchair:  Manual





Stairs


1 Step (curb) (QC):  88


4 Steps (QC):  88


12 Steps (QC):  88





Balance


Sitting Static:  Good


Sitting Dynamic:  Good


Standing Static:  Fair


 Standing Dynamic:  Fair


Picking up an Object (QC):  88





Treatment


PT eval complete





Assessment/Needs


Post surgery with resultant colostomy.  He presents with decreased functional s

trength and mobility and requires assist.  He will beneift from skilled PT to 

address these deficits to allow him to return home at a mod indep level.


Rehab Potential:  Good





PT Long Term Goals


Long Term Goals


PT Long Term Goals Time Frame:  Dec 25, 2019


Roll Left & Right (QC):  6


Sit to Lying (QC):  6


Lying-Sitting on Side/Bed(QC):  6


Sit to Stand (QC):  6


Chair/Bed-to-Chair Xfer(QC):  6


Toilet Transfer (QC):  6


Car Transfer (QC):  6


Does the Patient Walk:  Yes


Walk 10 feet (QC):  6


Walk 50ft with 2 Turns (QC):  6


Walk 150 ft (QC):  6


Walking 10ft on Uneven Surface:  6


1 Step (curb) (QC):  6


4 Steps (QC):  6


12 Steps (QC):  9


Picking up an Object (QC):  88


Does the Pt use WC or Scooter?:  No


Type:  N/A


Type:  N/A





PT Plan


Problem List


Problem List:  Activity Tolerance, Functional Strength, Safety, Balance, Gait, 

Transfer, Bed Mobility





Treatment/Plan


Treatment Plan:  Continue Plan of Care


Treatment Plan:  Bed Mobility, Education, Functional Activity Patricio, Functional 

Strength, Gait, Safety, Therapeutic Exercise, Transfers


Treatment Duration:  Dec 25, 2019


Frequency:  5 times per week (to 6 )


Estimated Hrs Per Day:  .25 hour per day


Patient and/or Family Agrees t:  Yes





Safety Risks/Education


Patient Education:  Transfer Techniques, Safety Issues


Teaching Recipient:  Patient


Teaching Methods:  Demonstration, Discussion


Response to Teaching:  Reinforcement Needed





Discharge Recommendations


Therapy Discharge Recommendati:  Post Acute PT (HHC PT)





Time/GCodes


Time In:  1445


Time Out:  1510


Total Billed Treatment Time:  25


Total Billed Treatment


visit


EVM 25











AVILA BHATTI PT             Dec 18, 2019 15:26

## 2019-12-18 NOTE — PROGRESS NOTE - HOSPITALIST
Subjective


HPI/CC On Admission


Date Seen by Provider:  Dec 18, 2019


Time Seen by Provider:  12:51


Patient is an 89-year-old  male with a past medical history of 

hypertension, stroke who presented to the emergency room due to abdominal pain. 

He reports his symptoms started yesterday and complained of pain all across the 

middle of his abdomen.  He has no previous history of similar pain but he does 

have a history of diverticulosis.  His last bowel movement was yesterday.  He 

denies any fevers or chills.  He was found to have perforated bowel and was 

admitted to the surgical services.  He is to go to the operating room this 

afternoon.  I am consulted for medical management.


Subjective/Events-last exam


Pt reports feeling well. Went in to a-fib last night and cardiology consulted. 

Patient is asymptomatic though. Denies any pain from surgery except when 

coughing.





Objective


Exam


Vital Signs





Vital Signs








  Date Time  Temp Pulse Resp B/P (MAP) Pulse Ox O2 Delivery O2 Flow Rate FiO2


 


19 10:11     90 Nasal Cannula 3.00 


 


19 08:55 37.2 107  177/70 (105)    


 


19 07:00   20     





Capillary Refill : Less Than 3 SecondsLess Than 3 Seconds


General Appearance:  No Apparent Distress, Chronically ill


Respiratory:  Lungs Clear, No Respiratory Distress


Cardiovascular:  No JVD, No Murmur, Irregularly Irregular


Gastrointestinal:  Abnormal Bowel Sounds, Distended, Other (colostomy)


Neurologic/Psychiatric:  Alert, Oriented x3





Results/Procedures


Lab


Laboratory Tests


19 03:30








Patient resulted labs reviewed.


Imaging:  Reviewed Imaging Report





Assessment/Plan


Assessment and Plan


Assess & Plan/Chief Complaint


Perforated Diverticulum


   POD #2 s/p resection and colostomy


   Continue Merrem


   Continue Pain regimen





A-fib


   new onset but rate controlled


   Monitor on telemetry


   Cardiology consulted, appreciate recs


   Needs therapeutic lovenox when ok with Surgery


   if needs further rate control will add cardizem





HTN


   BP improved, resumed propanolol





CKD


   Continue IVF


   UOP adequate


   Creatinine improved





History of CVA with right side deficits


   At baseline


   Hold plavix





Anxiety


   Ativan added prn as NPO





Diagnosis/Problems


Diagnosis/Problems





(1) Perforated diverticulum


Status:  Acute


(2) Hypertension


Qualifiers:  


   Hypertension type:  essential hypertension  Qualified Codes:  I10 - Essential

(primary) hypertension


(3) History of stroke


Status:  Chronic





Clinical Quality Measures


DVT/VTE Risk/Contraindication:


Risk Factor Score Per Nursin


RFS Level Per Nursing on Admit:  4+=Very High











EMMY SINGLETARY MD             Dec 18, 2019 12:56 pm

## 2019-12-18 NOTE — NUR
Met with pt and his wife and daughter concerning continued care plans. Discussed options of 
Home Health and skilled care in a community nursing home. Pt's wife an daughter express 
concern that family will be unable to manage his care at home at least short-term. They are 
considering possible skilled placement and would consider [facility]  but pt appears 
resistant to placement.will follow

## 2019-12-18 NOTE — DIAGNOSTIC IMAGING REPORT
INDICATION: Perforated diverticulitis.



COMPARISON: 12/17/2019



FINDINGS: Single frontal radiograph view of the chest was

obtained and demonstrates low respiratory volumes with increase

in left basilar airspace opacity. Atelectasis of the right base

is again noted. There is no pneumothorax on either side. Cardiac

silhouette and pulmonary vasculature within normal limits.

Left-sided subclavian central venous catheter seen with tip in

the right atrium.



IMPRESSION:

1. Persistent low lung volumes, but with increased left basilar

atelectasis and/or infiltrate.



Dictated by: 



  Dictated on workstation # [id number]

## 2019-12-18 NOTE — NUR
PT HAVING AUDIBLE WHEEZES. SOA AND C/O OF THAT HE IF TIERED. SUBCLAVIAN CENTRAL LINE LEAKING 
AND DRESSING WET. CHANGED DRESSING. NOTIFIED DR BARR ABOUT PT CONDITION. DR BARR INFORMED ME 
THAT HE IS AWARE ABOUT CENTRAL LINE. IT IS OK TO KEEP USING AND INSTEAD TO USE OPSITE 
DRESSING TO USE DANIELLA. NEW ORDERS RECEIVED. SEE ORDERS HX.

## 2019-12-18 NOTE — PROGRESS NOTE
Subjective


Date Seen by a Provider:  Dec 18, 2019


Time Seen by a Provider:  15:00


Subjective/Events-last exam


doing ok. slightly confused however normal baseline. ambulating well. gas in 

colostomy bag. pain controlled.





Objective


Exam





Vital Signs








  Date Time  Temp Pulse Resp B/P (MAP) Pulse Ox O2 Delivery O2 Flow Rate FiO2


 


19 15:04     90 Nasal Cannula 3.00 


 


19 12:52  97      


 


19 12:18 37.1 101 18 137/62 (87) 92 Nasal Cannula 2.00 


 


19 10:11     90 Nasal Cannula 3.00 


 


19 08:55 37.2 107  177/70 (105) 92 Nasal Cannula 2.00 


 


19 08:00      Nasal Cannula 2.00 


 


19 08:00 36.9       


 


19 08:00  115  103/78 (86)  Nasal Cannula 2.00 


 


19 07:00   20     


 


19 07:00  108  151/79 (103) 94 Nasal Cannula 2.00 


 


19 06:44  112      


 


19 06:25     94 Nasal Cannula 3.00 


 


19 06:00  94  142/80 (100) 94 Nasal Cannula 2.00 


 


19 05:00  97  163/89 (113) 92 Nasal Cannula 2.00 


 


19 04:00  96  153/87 (109) 94 Nasal Cannula 2.00 


 


19 04:00      Nasal Cannula 2.00 


 


19 03:00  104 12 151/85 (107) 93 Nasal Cannula 2.00 


 


19 02:00  106  151/70 (97) 93 Nasal Cannula 2.00 


 


19 01:00  112      


 


19 00:01  102 33 157/81 (106)  Nasal Cannula 2.00 


 


19 00:00 37.0       


 


19 00:00      Nasal Cannula 2.00 


 


19 23:00  122 21 156/78 (104) 90 Nasal Cannula 2.00 


 


19 22:08     93 Nasal Cannula 3.00 


 


19 22:00  104 14 124/65 (84) 93 Nasal Cannula 2.00 


 


19 21:00  125 27 147/74 (98) 93 Nasal Cannula 2.00 


 


19 20:00      Nasal Cannula 2.00 


 


19 20:00 37.0       


 


19 20:00  105 15 129/64 (85) 93 Nasal Cannula 2.00 


 


19 19:00  114 21 126/98 (107) 92 Nasal Cannula 2.00 


 


19 19:00  114      


 


19 18:00  105 13 106/52 (70) 96 Nasal Cannula 2.00 


 


19 17:00  110 26 118/56 (76) 96 Nasal Cannula 2.00 


 


19 16:00      OxyMask 2.00 


 


19 16:00 37.3       


 


19 16:00  108 18 111/83 (92) 96 Nasal Cannula 2.00 














I & O 


 


 19





 07:00


 


Intake Total 1790 ml


 


Output Total 1160 ml


 


Balance 630 ml





Capillary Refill : Less Than 3 SecondsLess Than 3 Seconds


General Appearance:  No Apparent Distress


HEENT:  PERRL/EOMI


Neck:  Full Range of Motion


Respiratory:  Chest Non Tender, Decreased Breath Sounds


Cardiovascular:  Regular Rate, Rhythm


Gastrointestinal:  soft, tenderness


Extremity:  Normal Capillary Refill


Neurologic/Psychiatric:  Alert, Oriented x3


Skin:  Normal Color





Results


Lab


Laboratory Tests


19 03:30: 


White Blood Count 10.3, Red Blood Count 3.30L, Hemoglobin 9.7L, Hematocrit 31L, 

Mean Corpuscular Volume 94, Mean Corpuscular Hemoglobin 29, Mean Corpuscular 

Hemoglobin Concent 31L, Red Cell Distribution Width 13.4, Platelet Count 154, 

Mean Platelet Volume 10.8H, Neutrophils (%) (Auto) 88H, Lymphocytes (%) (Auto) 

8L, Monocytes (%) (Auto) 5, Eosinophils (%) (Auto) 0, Basophils (%) (Auto) 0, 

Neutrophils # (Auto) 9.0H, Lymphocytes # (Auto) 0.8L, Monocytes # (Auto) 0.5, 

Eosinophils # (Auto) 0.0, Basophils # (Auto) 0.0, Sodium Level 142, Potassium 

Level 3.8, Chloride Level 110H, Carbon Dioxide Level 21, Anion Gap 11, Blood 

Urea Nitrogen 33H, Creatinine 1.60H, Estimat Glomerular Filtration Rate 41, 

BUN/Creatinine Ratio 21, Glucose Level 125H, Calcium Level 7.7L, Phosphorus 

Level 2.1L, Magnesium Level 1.8





Microbiology


12/15/19 MRSA Screen - Final, Complete





Assessment/Plan


Assessment/Plan


Assess & Plan/Chief Complaint


perforated sigmoid diverticulitis s/p sigmoid colectomy, end colostomy, 

hartmans.


continue ambulation.


hematuria secondary to hx BPH and placement conner. 


start PO pain meds and decrease IV.


transfer to floor.


SS for placement.





Clinical Quality Measures


DVT/VTE Risk/Contraindication:


Risk Factor Score Per Nursin


RFS Level Per Nursing on Admit:  4+=Very High











SUNG BARR MD                Dec 18, 2019 15:22

## 2019-12-18 NOTE — OCCUPATIONAL THERAPY EVAL
OT Evaluation-General/PLF


Medical Diagnosis


Admission Date


Dec 15, 2019 at 15:19


Medical Diagnosis:  sigmoid colon perforation


Onset Date:  Dec 16, 2019





Therapy Diagnosis


Therapy Diagnosis:  Decreased ADL abilities





Height/Weight


Height (Feet):  5


Height (Inches):  6.00


Weight (Pounds):  175





Precautions


Precautions/Isolations:  Fall Prevention, Standard Precautions





Weight Bear Status


Weight Bearing Restriction:  Weight Bearing/Tolerated


Location Restriction:  JUSTIN FEET





Referral


Physician:  Sierra Newton MD


Referral Reason:  Activity Tolerance, Self Care, Evaluation/Treatment, 

Strengthening/ROM





Medical History


Pertinent Medical History:  CVA, HTN


Additional Medical History


HTN, DVT hx, CVA (R side affected), degenerative joint disease.


Current History


sigmoid colon perforation and fixation 12/16/19


Reviewed History:  Yes





Social History


Home:  Single Level


Current Living Status:  Spouse


Entry Into Home:  Stairs With Railing


 Steps Into Home:  5


 Steps Inside Home:  0





ADL-Prior Level of Function


SCALE: Activities may be completed with or without assistive devices.





6-Indepedent-patient completes the activity by him/herself with no assistance 

from a helper.


5-Set-up or Clean-up Assistance-helper sets up or cleans up; patient completes 

activity. [place] assists only prior to or  


    following the activity.


4-Supervision or Touching Assistance-helper provides verbal cues and/or 

touching/steadying and/or contact guard assistance as patient completes 

activity. Assistance may be provided   


    throughout the activity or intermittently.


3-Partial/Moderate Assistance-helper does LESS THAN HALF the effort. [place] 

lifts, holds or supports trunk or limbs, but provides less than half the effort.


2-Substantial/Maximal Assistance-helper does MORE THAN HALF the effort. [place] 

lifts or holds trunk or limbs and provides more than half the effort.


2-Umzksednl-xqqddy does ALL the effort. Patient does none of the effort to 

complete the activity. Or, the assistance of 2 or more helpers is required for 

the patient to complete the  


    activity.


If activity was not attempted, code reason:


7-Patient Refused.


9-Not Applicable-not attempted and the patient did not perform the activity 

before the current illness, exacerbation or injury.


10-Not Attempted due to Environmental Limitations-(lack of equipment, weather 

restraints, etc.).


88-Not Attempted due to Medical Conditions or Safety Concerns.


ADL PLOF Comments


Pt required assist getting in/out of shower, showering tasks, and completing LB 

dressing when limited energy. Pt's wife states pt typically dresses self in 

morning. IADLs completed by spouse.


Self Care:  Needed Some Help


Functional Cognition:  Needed Some Help


DME/Equipment:  Bath Chair, Grab Bars (suction cupped ), Shower Hose Extender, 

Tub/Shower


DME/Equipment Comments


FWW


Occupation:  retired


Drive Self:  No





OT Current Status


Subjective


Pt seen sitting up in bed, wife and daughter present through session. Pt states 

no pain currently, but mild (2-3/10) pain during movement. Pt agreeable to OT tx

session.





Mental Status/Objective


Patient Orientation:  Person, Place, Situation


Attachments:  Luther Catheter, Oxygen (2L)





Current


Glasses/Contacts:  Yes


Hearing Aids:  No


Dentures/Partials:  Yes


Hand Dominance:  Right


Upper Extremity ROM


WFL BUE


Upper Extremity Coordination


WFL BUE


Upper Extremity Sensation


L hand fingertips (digits 2-5) numb/ tingling at times per pt.


Upper Extremity Strength


decreased bilaterally (4-/5)


similar  strength bilaterally


Edema:  Bilateral pitting edema dorsum of feet





ADL-Treatment


Eating (QC):  7


Oral Hygiene (QC):  7


Shower/Bathe Self (QC):  7


Upper Body Dressing (QC):  7


Lower Body Dressing (QC):  1 (Based on clinical observation, pt would be TD)


On/Off Footwear (QC):  1 (TD EOB, fair- sitting balance)


Toileting Hygiene (QC):  1 (Based on clinical observation, pt would be TD)


Toilet Transfer (QC):  7





Other Treatments


Pt seen in bed, educated on OT role and evaluation/ treatment process. Pt's 

daughter and wife provide info alongside pt. Pt completes bed mob with max A to 

EOB, attempts sock don/ doffing with TD. Pt's wife states CVA ~2.5 years prior, 

R side affected. Pt demonstrates similar strength bilaterally; swelling in RLE >

LLE. Pt sits EOB ~5 min, UE strength assessed, pt declines return to bed. Pt 

sits ~2 additional minutes, educated on breathing through nose rather than 

mouth. Pt c/o wheezing, nursing aides notified. Pt educated on PT role, pt 

states he only desires to get up 1x per day; pt states he typically gets up 

multiple times a day at home, pt educated on increasing endurance and strength 

while in hospital to complete daily tasks with safety at home. pt completes bed 

mob into bed with max A, TD for adjusting in bed. Pt left with all questions 

answered, call light in reach, all needs met.





Education


OT Patient Education:  Correct positioning, Instructions to caregiver, Modified 

ADL techniques, Purpose of tx/functional activities, Safety issues, Transfer 

techniques


Teaching Recipient:  Patient, Family, Significant Other


Teaching Methods:  Demonstration, Discussion


Response to Teaching:  Verbalize Understanding, Return Demonstration, 

Reinforcement Needed





OT Long Term Goals


Long Term Goals


Time Frame:  Dec 25, 2019


Eating (QC):  6


Oral Hygiene (QC):  6


Toileting Hygiene (QC):  4


Shower/Bathe Self (QC):  4


Upper Body Dressing (QC):  5


Lower Body Dressing (QC):  4


On/Off Footwear (QC):  6


Additional Goals:  1-Demonstrate ADL Tasks, 2-Verbalize Understanding, 3-

ImproveStrength/Patricio


1=Demonstrate adherence to instructed precautions during ADL tasks.


2=Patient will verbalize/demonstrate understanding of assistive devices

/modifications for ADL.


3=Patient will improve strength/tolerance for activity to enable patient to 

perform ADL's.





OT Education/Plan


Problem List/Assessment


Assessment:  Decreased Activ Tolerance, Decreased UE Strength, Dependent 

Transfers, Impaired Bed Mobility, Impaired Funct Balance, Impaired I ADL's, 

Impaired Self-Care Skills





Discharge Recommendations


Plan/Recommendations:  Continue POC


Therapy Discharge Recommendati:  Scheduled Assistance, Bath Aide, Home & Family,

Post Acute OT


Equpiment Recommendations-D/C:  Rails on Tub/Shower, Hip Kit





Treatment Plan/Plan of Care


Treatment,Training & Education:  Yes


Patient would benefit from OT for education, treatment and training to promote 

independence in ADL's, mobility, safety and/or upper extremity function for 

ADL's.


Plan of Care:  ADL Retraining, Caregiver Training, Functional Mobility, UE Funct

Exercise/Act


Treatment Duration:  Dec 25, 2019


Frequency:  5 times per week


Estimated Hrs Per Day:  .25 hour per day


Agreement:  Yes


Rehab Potential:  Fair





Time/GCodes


Start Time:  13:40


Stop Time:  14:08


Total Time Billed (hr/min):  28


Billed Treatment Time


1, EVM (10), ADL (18)= 28











NAVJOT KING OTR                Dec 18, 2019 14:22

## 2019-12-18 NOTE — NUR
Briefly spoke with Pt and family about ostomy care.  Gave pt family information and 
paperwork to fill out for [facility] start program.

## 2019-12-18 NOTE — NUR
REPORT GIVEN TO JOSH GONZALEZ ON FOURTH FLOOR.  PT TAKEN DOWN VIA WHEELCHAIR WITH BELONGINGS. 
FAMILY NOTIFIED OF ROOM CHANGE.

## 2019-12-19 VITALS — DIASTOLIC BLOOD PRESSURE: 62 MMHG | SYSTOLIC BLOOD PRESSURE: 136 MMHG

## 2019-12-19 VITALS — DIASTOLIC BLOOD PRESSURE: 70 MMHG | SYSTOLIC BLOOD PRESSURE: 153 MMHG

## 2019-12-19 VITALS — SYSTOLIC BLOOD PRESSURE: 160 MMHG | DIASTOLIC BLOOD PRESSURE: 82 MMHG

## 2019-12-19 VITALS — SYSTOLIC BLOOD PRESSURE: 162 MMHG | DIASTOLIC BLOOD PRESSURE: 93 MMHG

## 2019-12-19 VITALS — SYSTOLIC BLOOD PRESSURE: 177 MMHG | DIASTOLIC BLOOD PRESSURE: 70 MMHG

## 2019-12-19 VITALS — SYSTOLIC BLOOD PRESSURE: 167 MMHG | DIASTOLIC BLOOD PRESSURE: 82 MMHG

## 2019-12-19 VITALS — SYSTOLIC BLOOD PRESSURE: 153 MMHG | DIASTOLIC BLOOD PRESSURE: 70 MMHG

## 2019-12-19 VITALS — SYSTOLIC BLOOD PRESSURE: 136 MMHG | DIASTOLIC BLOOD PRESSURE: 62 MMHG

## 2019-12-19 VITALS — DIASTOLIC BLOOD PRESSURE: 79 MMHG | SYSTOLIC BLOOD PRESSURE: 149 MMHG

## 2019-12-19 LAB
BASOPHILS # BLD AUTO: 0 10^3/UL (ref 0–0.1)
BASOPHILS NFR BLD AUTO: 0 % (ref 0–10)
BUN/CREAT SERPL: 22
CALCIUM SERPL-MCNC: 8.1 MG/DL (ref 8.5–10.1)
CHLORIDE SERPL-SCNC: 111 MMOL/L (ref 98–107)
CO2 SERPL-SCNC: 21 MMOL/L (ref 21–32)
CREAT SERPL-MCNC: 1.3 MG/DL (ref 0.6–1.3)
EOSINOPHIL # BLD AUTO: 0 10^3/UL (ref 0–0.3)
EOSINOPHIL NFR BLD AUTO: 0 % (ref 0–10)
ERYTHROCYTE [DISTWIDTH] IN BLOOD BY AUTOMATED COUNT: 13.2 % (ref 10–14.5)
GFR SERPLBLD BASED ON 1.73 SQ M-ARVRAT: 52 ML/MIN
GLUCOSE SERPL-MCNC: 104 MG/DL (ref 70–105)
HCT VFR BLD CALC: 31 % (ref 40–54)
HGB BLD-MCNC: 9.8 G/DL (ref 13.3–17.7)
LYMPHOCYTES # BLD AUTO: 0.7 X 10^3 (ref 1–4)
LYMPHOCYTES NFR BLD AUTO: 6 % (ref 12–44)
MAGNESIUM SERPL-MCNC: 1.9 MG/DL (ref 1.6–2.4)
MANUAL DIFFERENTIAL PERFORMED BLD QL: NO
MCH RBC QN AUTO: 30 PG (ref 25–34)
MCHC RBC AUTO-ENTMCNC: 32 G/DL (ref 32–36)
MCV RBC AUTO: 93 FL (ref 80–99)
MONOCYTES # BLD AUTO: 0.6 X 10^3 (ref 0–1)
MONOCYTES NFR BLD AUTO: 6 % (ref 0–12)
NEUTROPHILS # BLD AUTO: 9.6 X 10^3 (ref 1.8–7.8)
NEUTROPHILS NFR BLD AUTO: 88 % (ref 42–75)
PHOSPHATE SERPL-MCNC: 1.7 MG/DL (ref 2.3–4.7)
PLATELET # BLD: 191 10^3/UL (ref 130–400)
PMV BLD AUTO: 10.5 FL (ref 7.4–10.4)
POTASSIUM SERPL-SCNC: 3.8 MMOL/L (ref 3.6–5)
SODIUM SERPL-SCNC: 143 MMOL/L (ref 135–145)
WBC # BLD AUTO: 10.9 10^3/UL (ref 4.3–11)

## 2019-12-19 RX ADMIN — POTASSIUM CHLORIDE SCH MEQ: 1500 TABLET, EXTENDED RELEASE ORAL at 06:53

## 2019-12-19 RX ADMIN — DOCUSATE SODIUM AND SENNOSIDES SCH EA: 8.6; 5 TABLET, FILM COATED ORAL at 08:02

## 2019-12-19 RX ADMIN — AMLODIPINE BESYLATE SCH MG: 5 TABLET ORAL at 11:53

## 2019-12-19 RX ADMIN — OXYCODONE HYDROCHLORIDE PRN MG: 5 SOLUTION ORAL at 13:48

## 2019-12-19 RX ADMIN — PANTOPRAZOLE SODIUM SCH MG: 40 TABLET, DELAYED RELEASE ORAL at 08:02

## 2019-12-19 RX ADMIN — SODIUM CHLORIDE SCH MLS/HR: 900 INJECTION INTRAVENOUS at 08:21

## 2019-12-19 RX ADMIN — MAGNESIUM SULFATE IN DEXTROSE SCH MLS/HR: 10 INJECTION, SOLUTION INTRAVENOUS at 06:53

## 2019-12-19 RX ADMIN — POTASSIUM CHLORIDE SCH MLS/HR: 200 INJECTION, SOLUTION INTRAVENOUS at 06:53

## 2019-12-19 RX ADMIN — APIXABAN SCH MG: 2.5 TABLET, FILM COATED ORAL at 08:02

## 2019-12-19 RX ADMIN — OXYCODONE HYDROCHLORIDE PRN MG: 5 SOLUTION ORAL at 08:03

## 2019-12-19 RX ADMIN — PROPRANOLOL HYDROCHLORIDE SCH MG: 20 TABLET ORAL at 08:02

## 2019-12-19 RX ADMIN — SODIUM CHLORIDE SCH MLS/HR: 900 INJECTION INTRAVENOUS at 16:03

## 2019-12-19 RX ADMIN — APIXABAN SCH MG: 2.5 TABLET, FILM COATED ORAL at 20:36

## 2019-12-19 NOTE — PROGRESS NOTE
Subjective


Date Seen by a Provider:  Dec 19, 2019


Time Seen by a Provider:  16:30


Subjective/Events-last exam


Patient reports doing well.  Reports some discomfort around the colostomy but 

denies any significant abdominal pain.  No N/V.  No Fever/chills.  Tolerating 

clear liquid diet.  No colostomy function yet.  Working with PT/OT.  Denies any 

SOB.  Abdominal dressing changed and incision cleansed.





Objective


Exam





Vital Signs








  Date Time  Temp Pulse Resp B/P (MAP) Pulse Ox O2 Delivery O2 Flow Rate FiO2


 


19 14:58     91 Nasal Cannula 2.00 


 


19 12:18  59      


 


19 12:00 37.2 59 20 136/62 (86) 94 Nasal Cannula 2.00 


 


19 11:17 37.1 59   92   


 


19 09:56  58      


 


19 08:52     90 Nasal Cannula 2.00 


 


19 08:00 37.1 77 16 162/93 (116) 93 Nasal Cannula 2.00 


 


19 08:00     90 Nasal Cannula 2.00 


 


19 07:00  76      


 


19 04:00 36.9 68 22 167/82 (110) 94 Nasal Cannula 2.00 


 


19 01:08 37.5 69   95   


 


19 01:00  70      


 


19 00:00 37.1 66 22 149/79 (102) 96 Nasal Cannula 2.00 


 


19 23:08     90 Nasal Cannula 2.00 


 


19 20:05      Nasal Cannula 2.00 


 


19 20:00 37.4 67 20 151/85 (107) 93 Nasal Cannula 2.00 


 


19 19:00  66      














I & O 


 


 19





 07:00


 


Intake Total 1560 ml


 


Output Total 1745 ml


 


Balance -185 ml





Capillary Refill : Less Than 3 SecondsLess Than 3 Seconds


General Appearance:  No Apparent Distress, WD/WN


Neck:  Full Range of Motion, Normal Inspection, Non Tender, Supple


Respiratory:  Normal Breath Sounds, No Accessory Muscle Use, No Respiratory 

Distress


Cardiovascular:  Regular Rate, Rhythm, No Murmur


Gastrointestinal:  soft, tenderness, other (Abd. SOWMYA drain with clear SS drainage

in bulb.)


Extremity:  Normal Capillary Refill, Normal Inspection, Normal Range of Motion, 

Non Tender, Swelling (approx. 2+ bilat)


Neurologic/Psychiatric:  Alert, Oriented x3


Skin:  Normal Color, Warm/Dry, Other (Abdominal incision C/D/I with staples in 

place.)





Results


Lab


Laboratory Tests


19 06:20: 


White Blood Count 10.9, Red Blood Count 3.32L, Hemoglobin 9.8L, Hematocrit 31L, 

Mean Corpuscular Volume 93, Mean Corpuscular Hemoglobin 30, Mean Corpuscular 

Hemoglobin Concent 32, Red Cell Distribution Width 13.2, Platelet Count 191, 

Mean Platelet Volume 10.5H, Neutrophils (%) (Auto) 88H, Lymphocytes (%) (Auto) 

6L, Monocytes (%) (Auto) 6, Eosinophils (%) (Auto) 0, Basophils (%) (Auto) 0, 

Neutrophils # (Auto) 9.6H, Lymphocytes # (Auto) 0.7L, Monocytes # (Auto) 0.6, 

Eosinophils # (Auto) 0.0, Basophils # (Auto) 0.0, Sodium Level 143, Potassium 

Level 3.8, Chloride Level 111H, Carbon Dioxide Level 21, Anion Gap 11, Blood 

Urea Nitrogen 29H, Creatinine 1.30, Estimat Glomerular Filtration Rate 52, 

BUN/Creatinine Ratio 22, Glucose Level 104, Calcium Level 8.1L, Phosphorus Level

1.7L, Magnesium Level 1.9, B-Type Natriuretic Peptide 609.5H





Microbiology


12/15/19 MRSA Screen - Final, Complete





Assessment/Plan


Assessment/Plan


Assess & Plan/Chief Complaint


A 89 year old male with perforated sigmoid diverticulitis s/p sigmoid colectomy,

end colostomy, hartmans.


continue ambulation.


hematuria secondary to hx BPH and placement conner. 


Continue PO pain meds.


SS for placement.


Continue PT/OT and IS.


Continue clear liquid diet, and will await colostomy function before advancing 

diet.





Clinical Quality Measures


DVT/VTE Risk/Contraindication:


Risk Factor Score Per Nursin


RFS Level Per Nursing on Admit:  4+=Very High











MIN BECKETT APRN          Dec 19, 2019 17:36

## 2019-12-19 NOTE — NUR
PER DR. MORRIS, I SPOKE WITH THE PATIENT'S WIFE AND EXPLAINED THE RISK PERCENTAGE BETWEEN 
ANOTHER STROKE AND THE BLOOD THINNER DR. MORRIS HAS HIM ON. THE PATIENT'S FAMILY IS FINE 
WITH HIM BEING ON THE BLOOD THINNER.

## 2019-12-19 NOTE — NUR
PATIENT HAD A HIGH SYSTOLIC BLOOD PRESSURE THIS AM  BEFOR HIS MORNING MEDICATIONS. 
BLOOD PRESSURE WAS RECHECKED AT 0830 AND IT . BLOOD PRESSURE WAS RECHECKED AGAIN AT 
0900 AND IT . THIS NURSE RECEIVED A CALL FROM THE TELEMONITOR ABOUT PATIENT BEING 
BRADYCARDIC. THIS INFORMATION WAS RELAYED TO DR. MORRIS WHO GAVE ORDERS FOR [id number] 
NOW AND DAILY.

## 2019-12-19 NOTE — CARDIOLOGY PROGRESS NOTE
Cardiology SOAP Progress Note


Subjective:


No cardiac complaints.





Objective:


I&O/Vital Signs











 12/19/19 12/19/19 12/19/19 12/19/19





 00:00 01:00 01:08 04:00


 


Temp 37.1  37.5 36.9


 


Pulse 66 70 69 68


 


Resp 22   22


 


B/P (MAP) 149/79 (102)   167/82 (110)


 


Pulse Ox 96  95 94


 


O2 Delivery Nasal Cannula   Nasal Cannula


 


O2 Flow Rate 2.00   2.00


 


    





 12/19/19 12/19/19 12/19/19 12/19/19





 07:00 08:00 08:00 08:52


 


Temp   37.1 


 


Pulse 76  77 


 


Resp   16 


 


B/P (MAP)   162/93 (116) 


 


Pulse Ox  90 93 90


 


O2 Delivery  Nasal Cannula Nasal Cannula Nasal Cannula


 


O2 Flow Rate  2.00 2.00 2.00


 


    





 12/19/19 12/19/19  





 09:56 11:17  


 


Temp  37.1  


 


Pulse 58 59  


 


Pulse Ox  92  














 12/19/19





 00:00


 


Intake Total 1460 ml


 


Output Total 900 ml


 


Balance 560 ml








Weight (Pounds):  175


Weight (Calculated Kilograms):  79.456640


Constitutional:  appears stated age, AAO x 3; No apparent distress; well-

developed, well-nourished


Respiratory:  chest is bilaterally symmetric, lungs clear to auscultation


Cardiovascular:  irregularly irregular, S1 and S2


Gastrointestional:  distended, tenderness; No spleenomegaly


Extremities:  normal range of motion, non-tender, normal inspection; No 

clubbing, No cyanosis; no lower extremity edema bilateral; No significant edema


Neurologic/Psychiatric:  no motor/sensory deficits, alert, normal mood/affect, 

oriented x 3, power is 5/5 both on sides


Skin:  normal color; No rash, No ulcerations





Results/Procedures:


Labs


Laboratory Tests


12/19/19 06:20: 


White Blood Count 10.9, Red Blood Count 3.32L, Hemoglobin 9.8L, Hematocrit 31L, 

Mean Corpuscular Volume 93, Mean Corpuscular Hemoglobin 30, Mean Corpuscular 

Hemoglobin Concent 32, Red Cell Distribution Width 13.2, Platelet Count 191, 

Mean Platelet Volume 10.5H, Neutrophils (%) (Auto) 88H, Lymphocytes (%) (Auto) 

6L, Monocytes (%) (Auto) 6, Eosinophils (%) (Auto) 0, Basophils (%) (Auto) 0, 

Neutrophils # (Auto) 9.6H, Lymphocytes # (Auto) 0.7L, Monocytes # (Auto) 0.6, 

Eosinophils # (Auto) 0.0, Basophils # (Auto) 0.0, Sodium Level 143, Potassium 

Level 3.8, Chloride Level 111H, Carbon Dioxide Level 21, Anion Gap 11, Blood 

Urea Nitrogen 29H, Creatinine 1.30, Estimat Glomerular Filtration Rate 52, 

BUN/Creatinine Ratio 22, Glucose Level 104, Calcium Level 8.1L, Phosphorus Level

1.7L, Magnesium Level 1.9, B-Type Natriuretic Peptide 609.5H





Microbiology


12/15/19 MRSA Screen - Final, Complete





A/P:


Assessment/Dx:


Perforated bowel, status post surgery 12/16/2019,


Anemia,


Chronic kidney disease,


Atrial fibrillation with rapid ventricular rate,


Mild shortness of breath


Plan:


Perforated bowel, status post surgery 12/16/2019,





Anemia,





Chronic kidney disease, on IV fluids.





Atrial fibrillation with controlled ventricular rate.  Eliquis low dose was 

started.  Cardizem for rate control.  I discussed at length with the patient and

spoke about stroke prevention versus bleeding with Eliquis.  The patient does 

have elevated risk of stroke.  He has history of TIA/CVA 3 years ago.  However 

there is at least a 2 percent per year risk of bleeding which includes major 

bleeding as well.  The patient will discuss with his wife and family and let us 

know if this is something he wants to do long-term.  He denies any recent fall.





Mild shortness of breath, mild elevated BNP.  Very likely mild acute diastolic 

congestive heart failure.  Echocardiogram done 12/18/2019 shows normal LV 

function.  Since the patient was in atrial fibrillation therefore diastolic fu

nction was not evaluated.  Moderate tricuspid regurgitation.  PA pressure of 55 

mmHg.








Thank you for your consultation. Please call me if you have any questions.








NAMAN Lopez MD, FACP, FACC, FSCAI, FHRS, CCDS


Interventional Cardiology


Cardiac Electrophysiology


Vascular Medicine and Endovascular Interventions











GOMEZ LOPEZ MD             Dec 19, 2019 11:44

## 2019-12-19 NOTE — PROGRESS NOTE - HOSPITALIST
Subjective


HPI/CC On Admission


Date Seen by Provider:  Dec 19, 2019


Time Seen by Provider:  13:34


Patient is an 89-year-old  male with a past medical history of 

hypertension, stroke who presented to the emergency room due to abdominal pain. 

He reports his symptoms started yesterday and complained of pain all across the 

middle of his abdomen.  He has no previous history of similar pain but he does 

have a history of diverticulosis.  His last bowel movement was yesterday.  He 

denies any fevers or chills.  He was found to have perforated bowel and was 

admitted to the surgical services.  He is to go to the operating room this 

afternoon.  I am consulted for medical management.


Subjective/Events-last exam


Pt reports feeling well. No pain. Family at bedside state he got confused last 

night and nursing called them to calm him down and reorient him. They plan to 

pursue NH placement.





Objective


Exam


Vital Signs





Vital Signs








  Date Time  Temp Pulse Resp B/P (MAP) Pulse Ox O2 Delivery O2 Flow Rate FiO2


 


19 12:18  59      


 


19 12:00 37.2  20 136/62 (86) 94 Nasal Cannula 2.00 





Capillary Refill : Less Than 3 SecondsLess Than 3 Seconds


General Appearance:  No Apparent Distress, Chronically ill


Respiratory:  Lungs Clear, No Respiratory Distress


Cardiovascular:  No Murmur, Irregularly Irregular


Neurologic/Psychiatric:  Alert, Oriented x3





Results/Procedures


Lab


Laboratory Tests


19 06:20








Patient resulted labs reviewed.


Imaging:  Reviewed Imaging Report





Assessment/Plan


Assessment and Plan


Assess & Plan/Chief Complaint


Perforated Diverticulum


   POD #3 s/p resection and colostomy


   Continue Merrem


   Continue Pain regimen





A-fib


   rate controlled


   Monitor on telemetry


   Cardiology consulted, appreciate recs


   Eliquis ordered, age adjusted dose





HTN


   BP well controlled





CKD


   Continue IVF


   UOP adequate


   Creatinine improved





History of CVA with right side deficits


   At baseline


   Dc plavix as is now on Eliquis





Anxiety


   Ativan added prn as NPO


   Will add Risperdal





Diagnosis/Problems


Diagnosis/Problems





(1) Perforated diverticulum


Status:  Acute


(2) Hypertension


Qualifiers:  


   Hypertension type:  essential hypertension  Qualified Codes:  I10 - Essential

(primary) hypertension


(3) History of stroke


Status:  Chronic





Clinical Quality Measures


DVT/VTE Risk/Contraindication:


Risk Factor Score Per Nursin


RFS Level Per Nursing on Admit:  4+=Very High











EMMY SINGLETARY MD              Dec 19, 2019 13:38

## 2019-12-19 NOTE — PHYSICAL THERAPY DAILY NOTE
PT Daily Note-Current


Subjective


Pt is sitting up in recliner with Aide taking vitals upon arrival.  Pt's sp & 

daughter arrive.  Pt agrees to PT.





Pain





   Numeric Pain Scale:  4


   Location:  Lower


   Location Body Site:  Back


   Pain Description:  Ache





Mental Status


Patient Orientation:  Person, Place, Situation


Attachments:  Oxygen, IV





Transfers


SCALE: Activities may be completed with or without assistive devices.





6-Indepedent-patient completes the activity by him/herself with no assistance 

from a helper.


5-Set-up or Clean-up Assistance-helper sets up or cleans up; patient completes 

activity. [place] assists only prior to or  


    following the activity.


4-Supervision or Touching Assistance-helper provides verbal cues and/or 

touching/steadying and/or contact guard assistance as patient completes 

activity. Assistance may be provided   


    throughout the activity or intermittently.


3-Partial/Moderate Assistance-helper does LESS THAN HALF the effort. [place] 

lifts, holds or supports trunk or limbs, but provides less than half the effort.


2-Substantial/Maximal Assistance-helper does MORE THAN HALF the effort. [place] 

lifts or holds trunk or limbs and provides more than half the effort.


3-Ilampfcbr-tugwod does ALL the effort. Patient does none of the effort to 

complete the activity. Or, the assistance of 2 or more helpers is required for 

the patient to complete the  


    activity.


If activity was not attempted, code reason:


7-Patient Refused.


9-Not Applicable-not attempted and the patient did not perform the activity 

before the current illness, exacerbation or injury.


10-Not Attempted due to Environmental Limitations-(lack of equipment, weather 

restraints, etc.).


88-Not Attempted due to Medical Conditions or Safety Concerns.


Sit to Stand (QC):  4





Weight Bearing


Right Lower Extremity:  Right


Weight Bearing/Tolerated


Left Lower Extremity:  Left


Weight Bearing/Tolerated





Exercises


Supine Ex:  Ankle pumps, Quad Set, Hip abd/add


Supine Reps:  15


Seated Therapy Exercises:  Sit to stand


Seated Reps:  2





Treatments


After vitals taken and RT & Aide leave, pt completes Supine Ex in chair since pt

had been sitting for extended time.  Pt practices sit to stands and PTA adjusts 

FWW per family request.  Pt resting in recliner with feet up at end of Rx & all 

needs met, call light in hand.





Assessment


Current Status:  Good Progress


Pt is gaining strength observed by pt's family.





PT Long Term Goals


Long Term Goals


PT Long Term Goals Time Frame:  Dec 25, 2019


Roll Left & Right (QC):  6


Sit to Lying (QC):  6


Lying-Sitting on Side/Bed(QC):  6


Sit to Stand (QC):  6


Chair/Bed-to-Chair Xfer(QC):  6


Toilet Transfer (QC):  6


Car Transfer (QC):  6


Does the Patient Walk:  Yes


Walk 10 feet (QC):  6


Walk 50ft with 2 Turns (QC):  6


Walk 150 ft (QC):  6


Walking 10ft on Uneven Surface:  6


1 Step (curb) (QC):  6


4 Steps (QC):  6


12 Steps (QC):  9


Picking up an Object (QC):  88


Does the Pt use WC or Scooter?:  No


Type:  N/A


Type:  N/A





PT Plan


Problem List


Problem List:  Activity Tolerance, Functional Strength, Transfer





Treatment/Plan


Treatment Plan:  Continue Plan of Care


Treatment Plan:  Bed Mobility, Education, Functional Activity Patricio, Functional 

Strength, Gait, Safety, Therapeutic Exercise, Transfers


Treatment Duration:  Dec 25, 2019


Frequency:  5 times per week (to 6 )


Estimated Hrs Per Day:  .25 hour per day


Patient and/or Family Agrees t:  Yes





Safety Risks/Education


Patient Education:  Transfer Techniques, Correct Positioning, Safety Issues


Teaching Recipient:  Patient


Teaching Methods:  Discussion


Response to Teaching:  Verbalize Understanding





Time/GCodes


Time In:  1130


Time Out:  1159


Total Billed Treatment Time:  29


Total Billed Treatment


1, EX x2 (29m)











GEOVANNI FERNANDES PTA              Dec 19, 2019 12:26

## 2019-12-19 NOTE — NUR
IRF Evaluation 



Order received to evaluate patient for the ARU. Chart review complete and patient denied 
admission. Denial in relation to criteria set forth by Medicare, "Reasonably be expected to 
actively participate in, and benefit significantly from, the intensive rehabilitation 
therapy program." Dr. Newton and CM/SS notified. According to SW, discharge planning for SNF 
in process. 



Thank you for this referral.

## 2019-12-19 NOTE — OCCUPATIONAL THER DAILY NOTE
OT Current Status-Daily Note


Subjective


Pt seen in recliner chair, daughter and wife present through session. pt 

agreeable to OT tx session, denies current pain.





Mental Status/Objective


Attachments:  Colostomy/Ileostomy, Drains, IV, Oxygen





ADL-Treatment


Therapy Code Descriptions/Definitions 





Functional [place] Measure:


0=Not Assessed/NA        4=Minimal Assistance


1=Total Assistance        5=Supervision or Setup


2=Maximal Assistance  6=Modified [place]


3=Moderate Assistance 7=Complete IndependenceSCALE: Activities may be completed 

with or without assistive devices.





6-Indepedent-patient completes the activity by him/herself with no assistance 

from a helper.


5-Set-up or Clean-up Assistance-helper sets up or cleans up; patient completes 

activity. [place] assists only prior to or  


    following the activity.


4-Supervision or Touching Assistance-helper provides verbal cues and/or 

touching/steadying and/or contact guard assistance as patient completes 

activity. Assistance may be provided   


    throughout the activity or intermittently.


3-Partial/Moderate Assistance-helper does LESS THAN HALF the effort. [place] 

lifts, holds or supports trunk or limbs, but provides less than half the effort.


2-Substantial/Maximal Assistance-helper does MORE THAN HALF the effort. [place] 

lifts or holds trunk or limbs and provides more than half the effort.


7-Mxshorazn-kjerys does ALL the effort. Patient does none of the effort to 

complete the activity. Or, the assistance of 2 or more helpers is required for 

the patient to complete the  


    activity.


If activity was not attempted, code reason:


7-Patient Refused.


9-Not Applicable-not attempted and the patient did not perform the activity 

before the current illness, exacerbation or injury.


10-Not Attempted due to Environmental Limitations-(lack of equipment, weather 

restraints, etc.).


88-Not Attempted due to Medical Conditions or Safety Concerns.


Upper Body Dressing (QC):  3


Toileting Hygiene (QC):  2





Other Treatment


Pt denies standing or commode use for urination, urinal placed under penis and 

pt urinates with max A. Pt competes clean-up with max A. Pt educated on benefits

of sitting through day and seated exercises. Pt states he would like to get 

strong but not feeling like moving at the moment. Pt completes UB change, 

completes with mod A due to buttons and drains. Pillow placed under pt's legs 

due to edema and stagnation/ indentations from recliner, pt educated of 

reasoning.  Pt left in chair, call light in reach, all needs met.





Education


OT Patient Education:  Correct positioning, Exercise program, Home exercise 

program, Modified ADL techniques, Progress toward Goal/Update tx plan


Teaching Recipient:  Patient


Teaching Methods:  Demonstration, Discussion


Response to Teaching:  Verbalize Understanding, Return Demonstration, 

Reinforcement Needed





OT Long Term Goals


Long Term Goals


Time Frame:  Dec 25, 2019


Eating (QC):  6


Oral Hygiene (QC):  6


Toileting Hygiene (QC):  4


Shower/Bathe Self (QC):  4


Upper Body Dressing (QC):  5


Lower Body Dressing (QC):  4


On/Off Footwear (QC):  6


Additional Goals:  1-Demonstrate ADL Tasks, 2-Verbalize Understanding, 3-

ImproveStrength/Patricio


1=Demonstrate adherence to instructed precautions during ADL tasks.


2=Patient will verbalize/demonstrate understanding of assistive devices/mod

ifications for ADL.


3=Patient will improve strength/tolerance for activity to enable patient to 

perform ADL's.





OT Education/Plan


Problem List/Assessment


Assessment:  Decreased Activ Tolerance, Decreased UE Strength, Edema, Impaired 

Bed Mobility, Impaired Funct Balance, Impaired I ADL's, Impaired Self-Care 

Skills





Discharge Recommendations


Plan/Recommendations:  Continue POC


Therapy Discharge Recommendati:  24 Hour Supervision, Post Acute OT





Treatment Plan/Plan of Care


Treatment,Training & Education:  Yes


Patient would benefit from OT for education, treatment and training to promote 

independence in ADL's, mobility, safety and/or upper extremity function for 

ADL's.


Plan of Care:  ADL Retraining, Caregiver Training, Functional Mobility, UE Funct

Exercise/Act


Treatment Duration:  Dec 25, 2019


Frequency:  5 times per week


Estimated Hrs Per Day:  .25 hour per day


Agreement:  Yes


Rehab Potential:  Good





Time/GCodes


Start Time:  14:10


Stop Time:  14:23


Total Time Billed (hr/min):  13


Billed Treatment Time


1, ADL (13)











NAVJOT KING OTR                Dec 19, 2019 14:53

## 2019-12-20 VITALS — DIASTOLIC BLOOD PRESSURE: 72 MMHG | SYSTOLIC BLOOD PRESSURE: 157 MMHG

## 2019-12-20 VITALS — DIASTOLIC BLOOD PRESSURE: 57 MMHG | SYSTOLIC BLOOD PRESSURE: 126 MMHG

## 2019-12-20 VITALS — DIASTOLIC BLOOD PRESSURE: 66 MMHG | SYSTOLIC BLOOD PRESSURE: 143 MMHG

## 2019-12-20 VITALS — DIASTOLIC BLOOD PRESSURE: 77 MMHG | SYSTOLIC BLOOD PRESSURE: 176 MMHG

## 2019-12-20 VITALS — DIASTOLIC BLOOD PRESSURE: 75 MMHG | SYSTOLIC BLOOD PRESSURE: 144 MMHG

## 2019-12-20 VITALS — DIASTOLIC BLOOD PRESSURE: 74 MMHG | SYSTOLIC BLOOD PRESSURE: 142 MMHG

## 2019-12-20 LAB
BASOPHILS # BLD AUTO: 0 10^3/UL (ref 0–0.1)
BASOPHILS NFR BLD AUTO: 0 % (ref 0–10)
BUN/CREAT SERPL: 28
CALCIUM SERPL-MCNC: 8.2 MG/DL (ref 8.5–10.1)
CHLORIDE SERPL-SCNC: 108 MMOL/L (ref 98–107)
CO2 SERPL-SCNC: 24 MMOL/L (ref 21–32)
CREAT SERPL-MCNC: 1 MG/DL (ref 0.6–1.3)
EOSINOPHIL # BLD AUTO: 0 10^3/UL (ref 0–0.3)
EOSINOPHIL NFR BLD AUTO: 0 % (ref 0–10)
ERYTHROCYTE [DISTWIDTH] IN BLOOD BY AUTOMATED COUNT: 13.1 % (ref 10–14.5)
GFR SERPLBLD BASED ON 1.73 SQ M-ARVRAT: > 60 ML/MIN
GLUCOSE SERPL-MCNC: 109 MG/DL (ref 70–105)
HCT VFR BLD CALC: 31 % (ref 40–54)
HGB BLD-MCNC: 9.8 G/DL (ref 13.3–17.7)
LYMPHOCYTES # BLD AUTO: 1 X 10^3 (ref 1–4)
LYMPHOCYTES NFR BLD AUTO: 9 % (ref 12–44)
MAGNESIUM SERPL-MCNC: 2.1 MG/DL (ref 1.6–2.4)
MANUAL DIFFERENTIAL PERFORMED BLD QL: NO
MCH RBC QN AUTO: 29 PG (ref 25–34)
MCHC RBC AUTO-ENTMCNC: 32 G/DL (ref 32–36)
MCV RBC AUTO: 93 FL (ref 80–99)
MONOCYTES # BLD AUTO: 0.8 X 10^3 (ref 0–1)
MONOCYTES NFR BLD AUTO: 8 % (ref 0–12)
NEUTROPHILS # BLD AUTO: 8.7 X 10^3 (ref 1.8–7.8)
NEUTROPHILS NFR BLD AUTO: 83 % (ref 42–75)
PHOSPHATE SERPL-MCNC: 1.7 MG/DL (ref 2.3–4.7)
PLATELET # BLD: 200 10^3/UL (ref 130–400)
PMV BLD AUTO: 10.5 FL (ref 7.4–10.4)
POTASSIUM SERPL-SCNC: 3.6 MMOL/L (ref 3.6–5)
SODIUM SERPL-SCNC: 142 MMOL/L (ref 135–145)
WBC # BLD AUTO: 10.5 10^3/UL (ref 4.3–11)

## 2019-12-20 RX ADMIN — LORAZEPAM PRN MG: 2 INJECTION INTRAMUSCULAR; INTRAVENOUS at 03:31

## 2019-12-20 RX ADMIN — SODIUM CHLORIDE SCH MLS/HR: 900 INJECTION INTRAVENOUS at 07:59

## 2019-12-20 RX ADMIN — ALBUTEROL SULFATE SCH MG: 2.5 SOLUTION RESPIRATORY (INHALATION) at 16:04

## 2019-12-20 RX ADMIN — DOCUSATE SODIUM AND SENNOSIDES SCH EA: 8.6; 5 TABLET, FILM COATED ORAL at 07:57

## 2019-12-20 RX ADMIN — POTASSIUM CHLORIDE ONE MEQ: 1500 TABLET, EXTENDED RELEASE ORAL at 06:49

## 2019-12-20 RX ADMIN — SODIUM CHLORIDE SCH MLS/HR: 900 INJECTION INTRAVENOUS at 15:38

## 2019-12-20 RX ADMIN — PANTOPRAZOLE SODIUM SCH MG: 40 TABLET, DELAYED RELEASE ORAL at 07:59

## 2019-12-20 RX ADMIN — SODIUM CHLORIDE SCH MLS/HR: 900 INJECTION INTRAVENOUS at 00:14

## 2019-12-20 RX ADMIN — AMLODIPINE BESYLATE SCH MG: 5 TABLET ORAL at 07:59

## 2019-12-20 RX ADMIN — ALBUTEROL SULFATE SCH MG: 2.5 SOLUTION RESPIRATORY (INHALATION) at 21:46

## 2019-12-20 RX ADMIN — PROPRANOLOL HYDROCHLORIDE SCH MG: 20 TABLET ORAL at 07:57

## 2019-12-20 RX ADMIN — POTASSIUM CHLORIDE SCH MLS/HR: 200 INJECTION, SOLUTION INTRAVENOUS at 06:37

## 2019-12-20 RX ADMIN — MAGNESIUM SULFATE IN DEXTROSE SCH MLS/HR: 10 INJECTION, SOLUTION INTRAVENOUS at 06:37

## 2019-12-20 RX ADMIN — APIXABAN SCH MG: 2.5 TABLET, FILM COATED ORAL at 07:59

## 2019-12-20 RX ADMIN — APIXABAN SCH MG: 2.5 TABLET, FILM COATED ORAL at 22:16

## 2019-12-20 RX ADMIN — ALBUTEROL SULFATE SCH MG: 2.5 SOLUTION RESPIRATORY (INHALATION) at 09:42

## 2019-12-20 RX ADMIN — POTASSIUM CHLORIDE SCH MEQ: 1500 TABLET, EXTENDED RELEASE ORAL at 06:43

## 2019-12-20 RX ADMIN — LORAZEPAM PRN MG: 2 INJECTION INTRAMUSCULAR; INTRAVENOUS at 22:16

## 2019-12-20 RX ADMIN — ALBUTEROL SULFATE SCH MG: 2.5 SOLUTION RESPIRATORY (INHALATION) at 03:26

## 2019-12-20 RX ADMIN — POTASSIUM CHLORIDE SCH MLS/HR: 200 INJECTION, SOLUTION INTRAVENOUS at 08:15

## 2019-12-20 RX ADMIN — POTASSIUM CHLORIDE ONE MEQ: 1500 TABLET, EXTENDED RELEASE ORAL at 06:55

## 2019-12-20 RX ADMIN — POTASSIUM CHLORIDE SCH MLS/HR: 200 INJECTION, SOLUTION INTRAVENOUS at 09:33

## 2019-12-20 NOTE — PROGRESS NOTE
Subjective


Date Seen by a Provider:  Dec 20, 2019


Time Seen by a Provider:  16:00


Subjective/Events-last exam


Patient seen with Dr. Malcolm.  Patient reports doing well.  Tolerating clears.  RN

reports patient is passing flatus from colostomy and has had to empty bag a few 

times today due to gas.  Working with PT/OT.  Denies any abdominal pain, N/V, 

Fever/chills.





Objective


Exam





Vital Signs








  Date Time  Temp Pulse Resp B/P (MAP) Pulse Ox O2 Delivery O2 Flow Rate FiO2


 


19 16:00 36.8 64 20 144/75 (98) 94 Nasal Cannula 2.00 


 


19 12:23  59      


 


19 12:00 37.0 58 18 126/57 (80) 95 Nasal Cannula 2.00 


 


19 09:42     95 Nasal Cannula 2.00 


 


19 08:00 36.1 72 18 176/77 (110) 90 Nasal Cannula 2.00 


 


19 08:00      Nasal Cannula 2.00 


 


19 07:00  91      


 


19 04:32 37.1 58 16 142/74 (96) 95 Nasal Cannula 2.00 


 


19 03:26     92 Nasal Cannula 2.00 


 


19 01:00  60      


 


19 00:34 37.2 59 20 157/72 (100) 92 Nasal Cannula 2.00 


 


19 22:28     92 Nasal Cannula 2.00 


 


19 21:43 37.5 68   95   28


 


19 20:00      Nasal Cannula 2.00 


 


19 20:00 37.6 53 20 160/82 (108) 94 Nasal Cannula 2.00 


 


19 19:00  68      














I & O 


 


 19





 07:00


 


Intake Total 2140 ml


 


Output Total 1155 ml


 


Balance 985 ml





Capillary Refill : Less Than 3 SecondsLess Than 3 Seconds


General Appearance:  No Apparent Distress, WD/WN


Neck:  Full Range of Motion, Normal Inspection, Non Tender, Supple


Respiratory:  Normal Breath Sounds, No Accessory Muscle Use, No Respiratory Dist

ress


Cardiovascular:  Regular Rate, Rhythm, No Murmur


Gastrointestinal:  normal bowel sounds, non tender, soft, other (SOWMYA drain with 

clear SS drainage, but more serous.  Colostomy Pink and moist with flatus heard 

multiple times during exam.)


Extremity:  Normal Capillary Refill, Normal Inspection, Normal Range of Motion, 

Swelling (Approx 1+ lower extremities)


Neurologic/Psychiatric:  Alert, Oriented x3


Skin:  Normal Color, Warm/Dry, Other (Inicision C/D/I.)





Results


Lab


Laboratory Tests


19 05:13: 


White Blood Count 10.5, Red Blood Count 3.34L, Hemoglobin 9.8L, Hematocrit 31L, 

Mean Corpuscular Volume 93, Mean Corpuscular Hemoglobin 29, Mean Corpuscular 

Hemoglobin Concent 32, Red Cell Distribution Width 13.1, Platelet Count 200, 

Mean Platelet Volume 10.5H, Neutrophils (%) (Auto) 83H, Lymphocytes (%) (Auto) 

9L, Monocytes (%) (Auto) 8, Eosinophils (%) (Auto) 0, Basophils (%) (Auto) 0, 

Neutrophils # (Auto) 8.7H, Lymphocytes # (Auto) 1.0, Monocytes # (Auto) 0.8, 

Eosinophils # (Auto) 0.0, Basophils # (Auto) 0.0, Sodium Level 142, Potassium 

Level 3.6, Chloride Level 108H, Carbon Dioxide Level 24, Anion Gap 10, Blood 

Urea Nitrogen 28H, Creatinine 1.00, Estimat Glomerular Filtration Rate > 60, BU

N/Creatinine Ratio 28, Glucose Level 109H, Calcium Level 8.2L, Phosphorus Level 

1.7L, Magnesium Level 2.1





Microbiology


12/15/19 MRSA Screen - Final, Complete





Assessment/Plan


Assessment/Plan


Assess & Plan/Chief Complaint


A 89 year old male with perforated sigmoid diverticulitis s/p sigmoid colectomy,

end colostomy, hartmans.


continue ambulation.


hematuria secondary to hx BPH and placement conner. 


Continue PO pain meds.


SS for placement.


Continue PT/OT and IS.


Will start DYS3 diet and plan is for rehab facility on Monday.





Clinical Quality Measures


DVT/VTE Risk/Contraindication:


Risk Factor Score Per Nursin


RFS Level Per Nursing on Admit:  4+=Very High











MIN BECKETT APRN          Dec 20, 2019 16:50

## 2019-12-20 NOTE — PROGRESS NOTE - HOSPITALIST
Subjective


HPI/CC On Admission


Date Seen by Provider:  Dec 20, 2019


Time Seen by Provider:  16:06


Patient is an 89-year-old  male with a past medical history of 

hypertension, stroke who presented to the emergency room due to abdominal pain. 

He reports his symptoms started yesterday and complained of pain all across the 

middle of his abdomen.  He has no previous history of similar pain but he does 

have a history of diverticulosis.  His last bowel movement was yesterday.  He 

denies any fevers or chills.  He was found to have perforated bowel and was 

admitted to the surgical services.  He is to go to the operating room this 

afternoon.  I am consulted for medical management.


Subjective/Events-last exam


Pt reports feeling well. No new complaints. He would very much like to discharge

but still has no gas in ostomy and on clears.





Objective


Exam


Vital Signs





Vital Signs








  Date Time  Temp Pulse Resp B/P (MAP) Pulse Ox O2 Delivery O2 Flow Rate FiO2


 


19 12:23  59      


 


19 12:00 37.0  18 126/57 (80) 95 Nasal Cannula 2.00 


 


19 21:43        28





Capillary Refill : Less Than 3 SecondsLess Than 3 Seconds


General Appearance:  No Apparent Distress, Chronically ill


Respiratory:  Lungs Clear, No Respiratory Distress


Cardiovascular:  Regular Rate, Rhythm, No Murmur


Neurologic/Psychiatric:  Alert, Oriented x3





Results/Procedures


Lab


Laboratory Tests


19 05:13








Patient resulted labs reviewed.


Imaging:  Reviewed Imaging Report





Assessment/Plan


Assessment and Plan


Assess & Plan/Chief Complaint


Perforated Diverticulum


   POD #4 s/p resection and colostomy


   Continue Merrem


   Continue Pain regimen


   Await bowel function





A-fib


   rate controlled


   Monitor on telemetry


   Cardiology consulted, appreciate recs


   Eliquis ordered, age adjusted dose





HTN


   BP well controlled





CKD


   Continue IVF


   UOP adequate


   Creatinine improved





History of CVA with right side deficits


   At baseline


   Dc plavix as is now on Eliquis





Anxiety


   Ativan added prn as NPO


   Risperdal 





Medically stable for discharge when ok with primary. Will round prn.





Diagnosis/Problems


Diagnosis/Problems





(1) Perforated diverticulum


Status:  Acute


(2) Hypertension


Qualifiers:  


   Hypertension type:  essential hypertension  Qualified Codes:  I10 - Essential

(primary) hypertension


(3) History of stroke


Status:  Chronic





Clinical Quality Measures


DVT/VTE Risk/Contraindication:


Risk Factor Score Per Nursin


RFS Level Per Nursing on Admit:  4+=Very High











EMMY SINGLETARY MD              Dec 20, 2019 16:06

## 2019-12-20 NOTE — CARDIOLOGY PROGRESS NOTE
Cardiology SOAP Progress Note


Subjective:


Mild shortness of breath.





Objective:


I&O/Vital Signs











 12/19/19 12/20/19 12/20/19 12/20/19





 22:28 00:34 01:00 03:26


 


Temp  37.2  


 


Pulse  59 60 


 


Resp  20  


 


B/P (MAP)  157/72 (100)  


 


Pulse Ox 92 92  92


 


O2 Delivery Nasal Cannula Nasal Cannula  Nasal Cannula


 


O2 Flow Rate 2.00 2.00  2.00


 


    





 12/20/19 12/20/19 12/20/19 





 04:32 07:00 09:42 


 


Temp 37.1   


 


Pulse 58 91  


 


Resp 16   


 


B/P (MAP) 142/74 (96)   


 


Pulse Ox 95  95 


 


O2 Delivery Nasal Cannula  Nasal Cannula 


 


O2 Flow Rate 2.00  2.00 














 12/20/19





 00:00


 


Intake Total 1980 ml


 


Output Total 855 ml


 


Balance 1125 ml








Weight (Pounds):  175


Weight (Calculated Kilograms):  79.949557


Constitutional:  appears stated age, AAO x 3; No apparent distress; well-

developed, well-nourished


Respiratory:  chest is bilaterally symmetric, lungs clear to auscultation


Cardiovascular:  irregularly irregular, S1 and S2


Gastrointestional:  distended, tenderness; No spleenomegaly


Extremities:  normal range of motion, non-tender, normal inspection; No 

clubbing, No cyanosis; no lower extremity edema bilateral; No significant edema


Neurologic/Psychiatric:  no motor/sensory deficits, alert, normal mood/affect, 

oriented x 3, power is 5/5 both on sides


Skin:  normal color; No rash, No ulcerations





Results/Procedures:


Labs


Laboratory Tests


12/20/19 05:13: 


White Blood Count 10.5, Red Blood Count 3.34L, Hemoglobin 9.8L, Hematocrit 31L, 

Mean Corpuscular Volume 93, Mean Corpuscular Hemoglobin 29, Mean Corpuscular 

Hemoglobin Concent 32, Red Cell Distribution Width 13.1, Platelet Count 200, 

Mean Platelet Volume 10.5H, Neutrophils (%) (Auto) 83H, Lymphocytes (%) (Auto) 

9L, Monocytes (%) (Auto) 8, Eosinophils (%) (Auto) 0, Basophils (%) (Auto) 0, 

Neutrophils # (Auto) 8.7H, Lymphocytes # (Auto) 1.0, Monocytes # (Auto) 0.8, 

Eosinophils # (Auto) 0.0, Basophils # (Auto) 0.0, Sodium Level 142, Potassium 

Level 3.6, Chloride Level 108H, Carbon Dioxide Level 24, Anion Gap 10, Blood 

Urea Nitrogen 28H, Creatinine 1.00, Estimat Glomerular Filtration Rate > 60, 

BUN/Creatinine Ratio 28, Glucose Level 109H, Calcium Level 8.2L, Phosphorus 

Level 1.7L, Magnesium Level 2.1





Microbiology


12/15/19 MRSA Screen - Final, Complete


           








A/P:


Assessment/Dx:


Perforated bowel, status post surgery 12/16/2019,


Anemia,


Chronic kidney disease,


Atrial fibrillation with rapid ventricular rate,


Mild shortness of breath


Plan:


Perforated bowel, status post surgery 12/16/2019,





Anemia,





Chronic kidney disease, on IV fluids.





Atrial fibrillation with controlled ventricular rate.  Eliquis low dose was 

started.  Cardizem for rate control.  I discussed at length with the patient and

spoke about stroke prevention versus bleeding with Eliquis.  The patient does 

have elevated risk of stroke.  He has history of TIA/CVA 3 years ago.  However t

here is at least a 2 percent per year risk of bleeding which includes major 

bleeding as well. He denies any recent fall.  I discussed again with the 

daughter and wife today.  Risk of bleeding was emphasized again.  However the 

risk of stroke was also discussed.  After mutual discussion it was decided to 

continue with Eliquis therapy twice a day.





Mild shortness of breath, mild elevated BNP.  Very likely mild acute diastolic 

congestive heart failure.  Echocardiogram done 12/18/2019 shows normal LV 

function.  Since the patient was in atrial fibrillation therefore diastolic 

function was not evaluated.  Moderate tricuspid regurgitation.  PA pressure of 

55 mmHg. I will recommend holding IV fluids for now.  Giving a dose of Lasix 20 

mg by mouth 1.





Okay to discharge from a cardiology perspective and follow up in office in 3-4 

weeks.








Thank you for your consultation. Please call me if you have any questions.








NAMAN Lopez MD, FACP, FACC, FSCAI, FHRS, CCDS


Interventional Cardiology


Cardiac Electrophysiology


Vascular Medicine and Endovascular Interventions











GOMEZ LOPEZ MD             Dec 20, 2019 09:57

## 2019-12-20 NOTE — NUR
Tried to give pt oral potassium at 0630. Pt had problems swallowing water without 
medication. Passed to CARLOS Fernandes the potassium IVPB to be administered.

## 2019-12-20 NOTE — OCCUPATIONAL THER DAILY NOTE
OT Current Status-Daily Note


Subjective


Pt seen in recliner chair post-PT session. Pt states moderate pain in L abdomen,

nursing notified of pain. Pt agreeable to OT tx session.





Mental Status/Objective


Patient Orientation:  Normal For Age


Attachments:  Colostomy/Ileostomy, Drains, Oxygen, Telemetry





ADL-Treatment


Therapy Code Descriptions/Definitions 





Functional [place] Measure:


0=Not Assessed/NA        4=Minimal Assistance


1=Total Assistance        5=Supervision or Setup


2=Maximal Assistance  6=Modified [place]


3=Moderate Assistance 7=Complete IndependenceSCALE: Activities may be completed 

with or without assistive devices.





6-Indepedent-patient completes the activity by him/herself with no assistance 

from a helper.


5-Set-up or Clean-up Assistance-helper sets up or cleans up; patient completes 

activity. [place] assists only prior to or  


    following the activity.


4-Supervision or Touching Assistance-helper provides verbal cues and/or 

touching/steadying and/or contact guard assistance as patient completes 

activity. Assistance may be provided   


    throughout the activity or intermittently.


3-Partial/Moderate Assistance-helper does LESS THAN HALF the effort. [place] 

lifts, holds or supports trunk or limbs, but provides less than half the effort.


2-Substantial/Maximal Assistance-helper does MORE THAN HALF the effort. [place] 

lifts or holds trunk or limbs and provides more than half the effort.


4-Agxkwqlrs-pvqjoa does ALL the effort. Patient does none of the effort to 

complete the activity. Or, the assistance of 2 or more helpers is required for 

the patient to complete the  


    activity.


If activity was not attempted, code reason:


7-Patient Refused.


9-Not Applicable-not attempted and the patient did not perform the activity 

before the current illness, exacerbation or injury.


10-Not Attempted due to Environmental Limitations-(lack of equipment, weather 

restraints, etc.).


88-Not Attempted due to Medical Conditions or Safety Concerns.


Oral Hygiene (QC):  7


Bathing Location:  L Arm, R Arm, L Upper Leg, R Upper Leg, Chest, Abdomen, 

Perineal Area


Shower/Bathe Self (QC):  3 (Pt requires assist with bottom, BLE, feet, and back)


Upper Body Dressing (QC):  5 (s/u for gown donning.)


Lower Body Dressing (QC):  7 (denied brief donning)


On/Off Footwear:  2 (Pt able to doff LLE sock, unable to reach RLE, donned with 

TD.)


Toileting Hygiene (QC):  2 (Pt stands at FWW, completes hygiene of bottom with 

max A.)





Other Treatment


Pt completes ADLs in chair/ at FWW level. Lotion applied on bilateral feet. Pt's

feet edematous, R>L, feet elevated in recliner on pillow. Pt educated on 

diaphragmatic breathing due to pain, pt left with daughter in room, call light 

in reach, all needs met.





Education


OT Patient Education:  Correct positioning, Modified ADL techniques, Purpose of 

tx/functional activities, Safety issues


Teaching Recipient:  Patient


Teaching Methods:  Demonstration, Discussion


Response to Teaching:  Verbalize Understanding, Return Demonstration





OT Long Term Goals


Long Term Goals


Time Frame:  Dec 25, 2019


Eating (QC):  6


Oral Hygiene (QC):  6


Toileting Hygiene (QC):  4


Shower/Bathe Self (QC):  4


Upper Body Dressing (QC):  5 (met)


Lower Body Dressing (QC):  4


On/Off Footwear (QC):  6


Additional Goals:  1-Demonstrate ADL Tasks, 2-Verbalize Understanding, 3-Impr

oveStrength/Patricio


1=Demonstrate adherence to instructed precautions during ADL tasks.


2=Patient will verbalize/demonstrate understanding of assistive 

devices/modifications for ADL.


3=Patient will improve strength/tolerance for activity to enable patient to 

perform ADL's.





OT Education/Plan


Problem List/Assessment


Assessment:  Decreased Activ Tolerance, Decreased UE Strength, Dependent 

Transfers, Edema, Impaired Bed Mobility, Impaired Funct Balance, Impaired I ADL'

s, Impaired Self-Care Skills





Discharge Recommendations


Plan/Recommendations:  Continue POC


Therapy Discharge Recommendati:  Scheduled Assistance, Post Acute OT


Equpiment Recommendations-D/C:  Hip Kit





Treatment Plan/Plan of Care


Treatment,Training & Education:  Yes


Patient would benefit from OT for education, treatment and training to promote 

independence in ADL's, mobility, safety and/or upper extremity function for 

ADL's.


Plan of Care:  ADL Retraining, Caregiver Training, Functional Mobility, UE Funct

Exercise/Act


Treatment Duration:  Dec 25, 2019


Frequency:  5 times per week


Estimated Hrs Per Day:  .25 hour per day


Agreement:  Yes


Rehab Potential:  Good





Time/GCodes


Start Time:  11:00


Stop Time:  11:24


Total Time Billed (hr/min):  24


Billed Treatment Time


1, ADL 2 (24)











NAVJOT KING OTR                Dec 20, 2019 11:35

## 2019-12-20 NOTE — NUR
Arrangements made for to be admitted [facility] skilled therapies on Monday the 
23rd if medically appropriate for transfer. Pt and family understand and agreeable with 
continued care plan.

## 2019-12-20 NOTE — PHYSICAL THERAPY DAILY NOTE
PT Daily Note-Current


Subjective


Patient agrees to PT.  He report he is ready to ambulate.





Pain





   Numeric Pain Scale:  5-Moderate Pain


   Location:  Lower


   Location Body Site:  Abdomen


   Pain Description:  Acute





Mental Status


Patient Orientation:  Normal For Age


Attachments:  Oxygen, Drains





Transfers


SCALE: Activities may be completed with or without assistive devices.





6-Indepedent-patient completes the activity by him/herself with no assistance 

from a helper.


5-Set-up or Clean-up Assistance-helper sets up or cleans up; patient completes 

activity. [place] assists only prior to or  


    following the activity.


4-Supervision or Touching Assistance-helper provides verbal cues and/or 

touching/steadying and/or contact guard assistance as patient completes 

activity. Assistance may be provided   


    throughout the activity or intermittently.


3-Partial/Moderate Assistance-helper does LESS THAN HALF the effort. [place] 

lifts, holds or supports trunk or limbs, but provides less than half the effort.


2-Substantial/Maximal Assistance-helper does MORE THAN HALF the effort. [place] 

lifts or holds trunk or limbs and provides more than half the effort.


8-Pbiashith-kykwmi does ALL the effort. Patient does none of the effort to 

complete the activity. Or, the assistance of 2 or more helpers is required for 

the patient to complete the  


    activity.


If activity was not attempted, code reason:


7-Patient Refused.


9-Not Applicable-not attempted and the patient did not perform the activity 

before the current illness, exacerbation or injury.


10-Not Attempted due to Environmental Limitations-(lack of equipment, weather 

restraints, etc.).


88-Not Attempted due to Medical Conditions or Safety Concerns.


Roll Left & Right (QC):  3


Sit to Lying (QC):  3


Lying to Sitting/Side of Bed(Q:  3


Sit to Stand (QC):  5


Chair/Bed-to-Chair Xfer(QC):  4





Weight Bearing


Right Lower Extremity:  Right


Weight Bearing/Tolerated


Left Lower Extremity:  Left


Weight Bearing/Tolerated





Gait Training


Does the Patient Walk?:  Yes


Distance:  75'


Walk 10 feet (QC):  3


Walk 50 ft with 2 Turns(QC):  3


Walk 150 ft (QC):  88


Gait Assistive Device:  FWW


Patient has AFO for right foot, however, declined to wear it on this date.  

Patient tolerated treatment well and is up in recliner with needs met.  Patient 

reports fatigue with minimal activity.





PT Long Term Goals


Long Term Goals


PT Long Term Goals Time Frame:  Dec 25, 2019


Roll Left & Right (QC):  6


Sit to Lying (QC):  6


Lying-Sitting on Side/Bed(QC):  6


Sit to Stand (QC):  6


Chair/Bed-to-Chair Xfer(QC):  6


Toilet Transfer (QC):  6


Car Transfer (QC):  6


Does the Patient Walk:  Yes


Walk 10 feet (QC):  6


Walk 50ft with 2 Turns (QC):  6


Walk 150 ft (QC):  6


Walking 10ft on Uneven Surface:  6


1 Step (curb) (QC):  6


4 Steps (QC):  6


12 Steps (QC):  9


Picking up an Object (QC):  88


Does the Pt use WC or Scooter?:  No


Type:  N/A


Type:  N/A





PT Plan


Treatment/Plan


Treatment Plan:  Continue Plan of Care


Treatment Plan:  Bed Mobility, Education, Functional Activity Patricio, Functional 

Strength, Gait, Safety, Therapeutic Exercise, Transfers


Treatment Duration:  Dec 25, 2019


Frequency:  5 times per week (to 6 )


Estimated Hrs Per Day:  .25 hour per day


Patient and/or Family Agrees t:  Yes





Time/GCodes


Time In:  1045


Time Out:  1100


Total Billed Treatment Time:  15


Total Billed Treatment


1 visit


FA 15 min











RAYRAY IVORY PT              Dec 20, 2019 11:15

## 2019-12-21 VITALS — SYSTOLIC BLOOD PRESSURE: 127 MMHG | DIASTOLIC BLOOD PRESSURE: 61 MMHG

## 2019-12-21 VITALS — DIASTOLIC BLOOD PRESSURE: 70 MMHG | SYSTOLIC BLOOD PRESSURE: 160 MMHG

## 2019-12-21 VITALS — DIASTOLIC BLOOD PRESSURE: 60 MMHG | SYSTOLIC BLOOD PRESSURE: 129 MMHG

## 2019-12-21 VITALS — DIASTOLIC BLOOD PRESSURE: 66 MMHG | SYSTOLIC BLOOD PRESSURE: 126 MMHG

## 2019-12-21 VITALS — DIASTOLIC BLOOD PRESSURE: 75 MMHG | SYSTOLIC BLOOD PRESSURE: 134 MMHG

## 2019-12-21 VITALS — SYSTOLIC BLOOD PRESSURE: 132 MMHG | DIASTOLIC BLOOD PRESSURE: 65 MMHG

## 2019-12-21 LAB
BASOPHILS # BLD AUTO: 0 10^3/UL (ref 0–0.1)
BASOPHILS NFR BLD AUTO: 0 % (ref 0–10)
BUN/CREAT SERPL: 27
CALCIUM SERPL-MCNC: 8.2 MG/DL (ref 8.5–10.1)
CHLORIDE SERPL-SCNC: 107 MMOL/L (ref 98–107)
CO2 SERPL-SCNC: 22 MMOL/L (ref 21–32)
CREAT SERPL-MCNC: 1 MG/DL (ref 0.6–1.3)
EOSINOPHIL # BLD AUTO: 0.1 10^3/UL (ref 0–0.3)
EOSINOPHIL NFR BLD AUTO: 1 % (ref 0–10)
ERYTHROCYTE [DISTWIDTH] IN BLOOD BY AUTOMATED COUNT: 13.3 % (ref 10–14.5)
GFR SERPLBLD BASED ON 1.73 SQ M-ARVRAT: > 60 ML/MIN
GLUCOSE SERPL-MCNC: 95 MG/DL (ref 70–105)
HCT VFR BLD CALC: 35 % (ref 40–54)
HGB BLD-MCNC: 11 G/DL (ref 13.3–17.7)
LYMPHOCYTES # BLD AUTO: 1 X 10^3 (ref 1–4)
LYMPHOCYTES NFR BLD AUTO: 8 % (ref 12–44)
MAGNESIUM SERPL-MCNC: 1.8 MG/DL (ref 1.6–2.4)
MANUAL DIFFERENTIAL PERFORMED BLD QL: NO
MCH RBC QN AUTO: 29 PG (ref 25–34)
MCHC RBC AUTO-ENTMCNC: 31 G/DL (ref 32–36)
MCV RBC AUTO: 92 FL (ref 80–99)
MONOCYTES # BLD AUTO: 0.8 X 10^3 (ref 0–1)
MONOCYTES NFR BLD AUTO: 7 % (ref 0–12)
NEUTROPHILS # BLD AUTO: 10.4 X 10^3 (ref 1.8–7.8)
NEUTROPHILS NFR BLD AUTO: 84 % (ref 42–75)
PHOSPHATE SERPL-MCNC: 1.9 MG/DL (ref 2.3–4.7)
PLATELET # BLD: 240 10^3/UL (ref 130–400)
PMV BLD AUTO: 11 FL (ref 7.4–10.4)
POTASSIUM SERPL-SCNC: 3.3 MMOL/L (ref 3.6–5)
SODIUM SERPL-SCNC: 142 MMOL/L (ref 135–145)
WBC # BLD AUTO: 12.4 10^3/UL (ref 4.3–11)

## 2019-12-21 RX ADMIN — SODIUM CHLORIDE SCH MLS/HR: 900 INJECTION INTRAVENOUS at 01:07

## 2019-12-21 RX ADMIN — ALBUTEROL SULFATE SCH MG: 2.5 SOLUTION RESPIRATORY (INHALATION) at 02:56

## 2019-12-21 RX ADMIN — ALBUTEROL SULFATE SCH MG: 2.5 SOLUTION RESPIRATORY (INHALATION) at 15:45

## 2019-12-21 RX ADMIN — POTASSIUM CHLORIDE SCH MLS/HR: 200 INJECTION, SOLUTION INTRAVENOUS at 07:14

## 2019-12-21 RX ADMIN — AMLODIPINE BESYLATE SCH MG: 5 TABLET ORAL at 08:41

## 2019-12-21 RX ADMIN — POTASSIUM CHLORIDE SCH MEQ: 1500 TABLET, EXTENDED RELEASE ORAL at 08:40

## 2019-12-21 RX ADMIN — APIXABAN SCH MG: 2.5 TABLET, FILM COATED ORAL at 08:41

## 2019-12-21 RX ADMIN — LORAZEPAM PRN MG: 2 INJECTION INTRAMUSCULAR; INTRAVENOUS at 02:38

## 2019-12-21 RX ADMIN — PANTOPRAZOLE SODIUM SCH MG: 40 TABLET, DELAYED RELEASE ORAL at 08:41

## 2019-12-21 RX ADMIN — POTASSIUM CHLORIDE SCH MEQ: 1500 TABLET, EXTENDED RELEASE ORAL at 07:17

## 2019-12-21 RX ADMIN — APIXABAN SCH MG: 5 TABLET, FILM COATED ORAL at 20:30

## 2019-12-21 RX ADMIN — POTASSIUM CHLORIDE SCH MEQ: 1500 TABLET, EXTENDED RELEASE ORAL at 07:25

## 2019-12-21 RX ADMIN — ALBUTEROL SULFATE SCH MG: 2.5 SOLUTION RESPIRATORY (INHALATION) at 07:49

## 2019-12-21 RX ADMIN — DOCUSATE SODIUM AND SENNOSIDES SCH EA: 8.6; 5 TABLET, FILM COATED ORAL at 08:41

## 2019-12-21 RX ADMIN — PROPRANOLOL HYDROCHLORIDE SCH MG: 20 TABLET ORAL at 08:40

## 2019-12-21 RX ADMIN — MAGNESIUM SULFATE IN DEXTROSE SCH MLS/HR: 10 INJECTION, SOLUTION INTRAVENOUS at 07:13

## 2019-12-21 RX ADMIN — ALBUTEROL SULFATE SCH MG: 2.5 SOLUTION RESPIRATORY (INHALATION) at 21:30

## 2019-12-21 NOTE — NUR
SOWMYA DRAIN FOUND IN TRASH WILL 100 ML OF SEROSANGIOUS FLUID. NO TRAUMA TO EXIT SITE, STITCH 
THAT WAS THERE PRIOR APPEARED TO BE CUT. PT DENIES PAIN TO SITE.

## 2019-12-21 NOTE — NUR
PT REQUESTED MEDICATION TO HELP HIM SLEEP TONIGHT. AM SHIFT REPORTED PT WAS SLEEPING MOST OF 
THE DAY. DR SINGLETARY NOTIFIED AND ORDERED 3 MG MELATONIN HS PRN

## 2019-12-21 NOTE — CARDIOLOGY PROGRESS NOTE
Cardiology SOAP Progress Note


Subjective:


No cardiac symptoms.





Objective:


I&O/Vital Signs











 12/21/19 12/21/19 12/21/19 12/21/19





 04:00 07:00 07:50 08:00


 


Temp 36.6   36.6


 


Pulse 73 74  75


 


Resp 20   18


 


B/P (MAP) 160/70 (100)   134/75 (94)


 


Pulse Ox 96  96 97


 


O2 Delivery Nasal Cannula  Nasal Cannula Nasal Cannula


 


O2 Flow Rate 2.00  2.00 2.00


 


    





 12/21/19 12/21/19 12/21/19 





 08:00 12:00 13:00 


 


Temp  36.8  


 


Pulse  64 66 


 


Resp  18  


 


B/P (MAP)  132/65 (87)  


 


Pulse Ox  97  


 


O2 Delivery Nasal Cannula Nasal Cannula  


 


O2 Flow Rate 2.00 2.00  














 12/21/19





 00:00


 


Intake Total 730 ml


 


Output Total 2030 ml


 


Balance -1300 ml








Weight (Pounds):  175


Weight (Calculated Kilograms):  79.871445


Constitutional:  appears stated age, AAO x 3; No apparent distress; well-

developed, well-nourished


Respiratory:  chest is bilaterally symmetric, lungs clear to auscultation


Cardiovascular:  irregularly irregular, S1 and S2


Gastrointestional:  distended, tenderness; No spleenomegaly


Extremities:  normal range of motion, non-tender, normal inspection; No 

clubbing, No cyanosis; no lower extremity edema bilateral; No significant edema


Neurologic/Psychiatric:  no motor/sensory deficits, alert, normal mood/affect, 

oriented x 3, power is 5/5 both on sides


Skin:  normal color; No rash, No ulcerations





Results/Procedures:


Labs


Laboratory Tests


12/21/19 05:52: 


White Blood Count 12.4H, Red Blood Count 3.84L, Hemoglobin 11.0L, Hematocrit 35L

, Mean Corpuscular Volume 92, Mean Corpuscular Hemoglobin 29, Mean Corpuscular 

Hemoglobin Concent 31L, Red Cell Distribution Width 13.3, Platelet Count 240, 

Mean Platelet Volume 11.0H, Neutrophils (%) (Auto) 84H, Lymphocytes (%) (Auto) 

8L, Monocytes (%) (Auto) 7, Eosinophils (%) (Auto) 1, Basophils (%) (Auto) 0, 

Neutrophils # (Auto) 10.4H, Lymphocytes # (Auto) 1.0, Monocytes # (Auto) 0.8, 

Eosinophils # (Auto) 0.1, Basophils # (Auto) 0.0, Sodium Level 142, Potassium 

Level 3.3L, Chloride Level 107, Carbon Dioxide Level 22, Anion Gap 13, Blood 

Urea Nitrogen 27H, Creatinine 1.00, Estimat Glomerular Filtration Rate > 60, 

BUN/Creatinine Ratio 27, Glucose Level 95, Calcium Level 8.2L, Phosphorus Level 

1.9L, Magnesium Level 1.8





Microbiology


12/15/19 MRSA Screen - Final, Complete


           








A/P:


Assessment/Dx:


Perforated bowel, status post surgery 12/16/2019,


Anemia,


Chronic kidney disease,


Atrial fibrillation with rapid ventricular rate,


Mild shortness of breath


Plan:


Perforated bowel, status post surgery 12/16/2019,





Anemia,





Chronic kidney disease, on IV fluids.





Atrial fibrillation with controlled ventricular rate.  Eliquis 2.5 mg twice a 

day.  Cardizem.





Mild shortness of breath, mild elevated BNP.  Very likely mild acute diastolic 

congestive heart failure.  Echocardiogram done 12/18/2019 shows normal LV 

function.  Since the patient was in atrial fibrillation therefore diastolic 

function was not evaluated.  Moderate tricuspid regurgitation.  PA pressure of 

55 mmHg. improved shortness of breath.





Awaiting transfer to skilled nursing facility on Monday 12/23/2019.








Thank you for your consultation. Please call me if you have any questions.








NAMAN Lopez MD, FACP, FACC, FSCAI, FHRS, CCDS


Interventional Cardiology


Cardiac Electrophysiology


Vascular Medicine and Endovascular Interventions











GMOEZ LOPEZ MD             Dec 21, 2019 15:40

## 2019-12-21 NOTE — PROGRESS NOTE
Subjective


Date Seen by a Provider:  Dec 21, 2019


Time Seen by a Provider:  11:40


Subjective/Events-last exam


doing well. tolerating diet. sitting upright however needs some assistance with 

ambulation which has become baseline as of late. functional colostomy. no 

fever/chills.





Objective


Exam





Vital Signs








  Date Time  Temp Pulse Resp B/P (MAP) Pulse Ox O2 Delivery O2 Flow Rate FiO2


 


19 08:00      Nasal Cannula 2.00 


 


19 08:00 36.6 75 18 134/75 (94) 97 Nasal Cannula 2.00 


 


19 07:50     96 Nasal Cannula 2.00 


 


19 07:00  74      


 


19 04:00 36.6 73 20 160/70 (100) 96 Nasal Cannula 2.00 


 


19 02:57     94 Nasal Cannula 2.00 


 


19 01:00  75      


 


19 00:14 36.6 53 20 126/66 (86) 97 Nasal Cannula 2.00 


 


19 21:46     94 Nasal Cannula 2.00 


 


19 20:28 36.8 65 18 143/66 (91) 95 Nasal Cannula 2.00 


 


19 20:00      Nasal Cannula 2.00 


 


19 19:00  74      


 


19 16:00 36.8 64 20 144/75 (98) 94 Nasal Cannula 2.00 


 


19 12:23  59      


 


19 12:00 37.0 58 18 126/57 (80) 95 Nasal Cannula 2.00 














I & O 


 


 19





 07:00


 


Intake Total 1040 ml


 


Output Total 2530 ml


 


Balance -1490 ml





Capillary Refill : Less Than 3 SecondsLess Than 3 Seconds


General Appearance:  No Apparent Distress


HEENT:  PERRL/EOMI


Neck:  Full Range of Motion


Respiratory:  Chest Non Tender, Lungs Clear, Normal Breath Sounds


Cardiovascular:  Regular Rate, Rhythm


Gastrointestinal:  normal bowel sounds, non tender, soft


Extremity:  Normal Capillary Refill


Neurologic/Psychiatric:  Alert, Oriented x3


Skin:  Normal Color


Lymphatic:  No Adenopathy





Results


Lab


Laboratory Tests


19 05:52: 


White Blood Count 12.4H, Red Blood Count 3.84L, Hemoglobin 11.0L, Hematocrit 35L

, Mean Corpuscular Volume 92, Mean Corpuscular Hemoglobin 29, Mean Corpuscular 

Hemoglobin Concent 31L, Red Cell Distribution Width 13.3, Platelet Count 240, 

Mean Platelet Volume 11.0H, Neutrophils (%) (Auto) 84H, Lymphocytes (%) (Auto) 

8L, Monocytes (%) (Auto) 7, Eosinophils (%) (Auto) 1, Basophils (%) (Auto) 0, 

Neutrophils # (Auto) 10.4H, Lymphocytes # (Auto) 1.0, Monocytes # (Auto) 0.8, 

Eosinophils # (Auto) 0.1, Basophils # (Auto) 0.0, Sodium Level 142, Potassium 

Level 3.3L, Chloride Level 107, Carbon Dioxide Level 22, Anion Gap 13, Blood 

Urea Nitrogen 27H, Creatinine 1.00, Estimat Glomerular Filtration Rate > 60, 

BUN/Creatinine Ratio 27, Glucose Level 95, Calcium Level 8.2L, Phosphorus Level 

1.9L, Magnesium Level 1.8





Microbiology


12/15/19 MRSA Screen - Final, Complete





Assessment/Plan


Assessment/Plan


Assess & Plan/Chief Complaint


perforated sigmoid diverticulitis s/p sigmoid colectomy, end colostomy, 

hartmans.


continue ambulation/PT/OT.


SS for placement. likely [facility] on monday. 





Clinical Quality Measures


DVT/VTE Risk/Contraindication:


Risk Factor Score Per Nursin


RFS Level Per Nursing on Admit:  4+=Very High











SUNG BARR MD                Dec 21, 2019 11:49

## 2019-12-22 VITALS — DIASTOLIC BLOOD PRESSURE: 53 MMHG | SYSTOLIC BLOOD PRESSURE: 109 MMHG

## 2019-12-22 VITALS — DIASTOLIC BLOOD PRESSURE: 73 MMHG | SYSTOLIC BLOOD PRESSURE: 128 MMHG

## 2019-12-22 VITALS — SYSTOLIC BLOOD PRESSURE: 124 MMHG | DIASTOLIC BLOOD PRESSURE: 60 MMHG

## 2019-12-22 VITALS — DIASTOLIC BLOOD PRESSURE: 59 MMHG | SYSTOLIC BLOOD PRESSURE: 124 MMHG

## 2019-12-22 VITALS — DIASTOLIC BLOOD PRESSURE: 58 MMHG | SYSTOLIC BLOOD PRESSURE: 119 MMHG

## 2019-12-22 VITALS — SYSTOLIC BLOOD PRESSURE: 113 MMHG | DIASTOLIC BLOOD PRESSURE: 59 MMHG

## 2019-12-22 LAB
BASOPHILS # BLD AUTO: 0 10^3/UL (ref 0–0.1)
BASOPHILS NFR BLD AUTO: 0 % (ref 0–10)
BUN/CREAT SERPL: 29
CALCIUM SERPL-MCNC: 8.1 MG/DL (ref 8.5–10.1)
CHLORIDE SERPL-SCNC: 109 MMOL/L (ref 98–107)
CO2 SERPL-SCNC: 24 MMOL/L (ref 21–32)
CREAT SERPL-MCNC: 0.9 MG/DL (ref 0.6–1.3)
EOSINOPHIL # BLD AUTO: 0.4 10^3/UL (ref 0–0.3)
EOSINOPHIL NFR BLD AUTO: 2 % (ref 0–10)
EOSINOPHIL NFR BLD MANUAL: 2 %
ERYTHROCYTE [DISTWIDTH] IN BLOOD BY AUTOMATED COUNT: 13.4 % (ref 10–14.5)
GFR SERPLBLD BASED ON 1.73 SQ M-ARVRAT: > 60 ML/MIN
GLUCOSE SERPL-MCNC: 128 MG/DL (ref 70–105)
HCT VFR BLD CALC: 35 % (ref 40–54)
HGB BLD-MCNC: 10.8 G/DL (ref 13.3–17.7)
LYMPHOCYTES # BLD AUTO: 1.5 X 10^3 (ref 1–4)
LYMPHOCYTES NFR BLD AUTO: 10 % (ref 12–44)
MAGNESIUM SERPL-MCNC: 1.9 MG/DL (ref 1.6–2.4)
MANUAL DIFFERENTIAL PERFORMED BLD QL: YES
MCH RBC QN AUTO: 29 PG (ref 25–34)
MCHC RBC AUTO-ENTMCNC: 31 G/DL (ref 32–36)
MCV RBC AUTO: 93 FL (ref 80–99)
MONOCYTES # BLD AUTO: 1.1 X 10^3 (ref 0–1)
MONOCYTES NFR BLD AUTO: 7 % (ref 0–12)
MONOCYTES NFR BLD: 4 %
NEUTROPHILS # BLD AUTO: 11.5 X 10^3 (ref 1.8–7.8)
NEUTROPHILS NFR BLD AUTO: 80 % (ref 42–75)
NEUTS BAND NFR BLD MANUAL: 81 %
PHOSPHATE SERPL-MCNC: 2 MG/DL (ref 2.3–4.7)
PLATELET # BLD: 251 10^3/UL (ref 130–400)
PMV BLD AUTO: 11.2 FL (ref 7.4–10.4)
POTASSIUM SERPL-SCNC: 4 MMOL/L (ref 3.6–5)
SODIUM SERPL-SCNC: 142 MMOL/L (ref 135–145)
VARIANT LYMPHS NFR BLD MANUAL: 13 %
WBC # BLD AUTO: 14.5 10^3/UL (ref 4.3–11)

## 2019-12-22 RX ADMIN — APIXABAN SCH MG: 5 TABLET, FILM COATED ORAL at 21:02

## 2019-12-22 RX ADMIN — PANTOPRAZOLE SODIUM SCH MG: 40 TABLET, DELAYED RELEASE ORAL at 08:39

## 2019-12-22 RX ADMIN — MAGNESIUM SULFATE IN DEXTROSE SCH MLS/HR: 10 INJECTION, SOLUTION INTRAVENOUS at 06:00

## 2019-12-22 RX ADMIN — ALBUTEROL SULFATE SCH MG: 2.5 SOLUTION RESPIRATORY (INHALATION) at 02:43

## 2019-12-22 RX ADMIN — APIXABAN SCH MG: 5 TABLET, FILM COATED ORAL at 08:39

## 2019-12-22 RX ADMIN — ALBUTEROL SULFATE SCH MG: 2.5 SOLUTION RESPIRATORY (INHALATION) at 15:03

## 2019-12-22 RX ADMIN — PROPRANOLOL HYDROCHLORIDE SCH MG: 20 TABLET ORAL at 08:39

## 2019-12-22 RX ADMIN — POTASSIUM CHLORIDE SCH MLS/HR: 200 INJECTION, SOLUTION INTRAVENOUS at 06:00

## 2019-12-22 RX ADMIN — ALBUTEROL SULFATE SCH MG: 2.5 SOLUTION RESPIRATORY (INHALATION) at 09:14

## 2019-12-22 RX ADMIN — DOCUSATE SODIUM AND SENNOSIDES SCH EA: 8.6; 5 TABLET, FILM COATED ORAL at 08:39

## 2019-12-22 RX ADMIN — AMLODIPINE BESYLATE SCH MG: 5 TABLET ORAL at 08:39

## 2019-12-22 RX ADMIN — POTASSIUM CHLORIDE SCH MEQ: 1500 TABLET, EXTENDED RELEASE ORAL at 06:00

## 2019-12-22 RX ADMIN — ALBUTEROL SULFATE SCH MG: 2.5 SOLUTION RESPIRATORY (INHALATION) at 21:22

## 2019-12-22 NOTE — PROGRESS NOTE
Subjective


Date Seen by a Provider:  Dec 22, 2019


Time Seen by a Provider:  11:30


Subjective/Events-last exam


doing well. no new complaints. functional colostomy. tolerating diet. sitting 

upright. needs some assistance with ambulation. no fever/chills.





Objective


Exam





Vital Signs








  Date Time  Temp Pulse Resp B/P (MAP) Pulse Ox O2 Delivery O2 Flow Rate FiO2


 


19 09:15     91 Nasal Cannula 2.00 


 


19 08:00      Nasal Cannula 2.00 


 


19 07:53 36.9 62 18 124/60 (81) 95 Room Air  


 


19 07:00  70      


 


19 04:00 36.2 65 20 128/73 (91) 94 Room Air  


 


19 02:40     91 Room Air  


 


19 01:00  65      


 


19 00:04 36.8 67 18 113/59 (77) 93 Room Air  


 


19 20:00      Nasal Cannula 2.00 


 


19 19:47 36.5 63 20 129/60 (83) 95 Nasal Cannula 2.00 


 


19 19:00  64      


 


19 16:13 36.6 60 20 127/61 (83) 95 Nasal Cannula 2.00 


 


19 15:45     95 Nasal Cannula 2.00 


 


19 13:00  66      


 


19 12:00 36.8 64 18 132/65 (87) 97 Nasal Cannula 2.00 














I & O 


 


 19





 07:00


 


Intake Total 1100 ml


 


Output Total 665 ml


 


Balance 435 ml





Capillary Refill : Less Than 3 SecondsLess Than 3 Seconds


General Appearance:  No Apparent Distress


HEENT:  PERRL/EOMI


Neck:  Full Range of Motion


Respiratory:  Chest Non Tender, Lungs Clear, Normal Breath Sounds


Cardiovascular:  Regular Rate, Rhythm


Gastrointestinal:  normal bowel sounds, soft


Extremity:  Normal Capillary Refill


Neurologic/Psychiatric:  Alert, Oriented x3


Skin:  Normal Color


Lymphatic:  No Adenopathy





Results


Lab


Laboratory Tests


19 05:20: 


White Blood Count 14.5H, Red Blood Count 3.71L, Hemoglobin 10.8L, Hematocrit 35L

, Mean Corpuscular Volume 93, Mean Corpuscular Hemoglobin 29, Mean Corpuscular 

Hemoglobin Concent 31L, Red Cell Distribution Width 13.4, Platelet Count 251, 

Mean Platelet Volume 11.2H, Neutrophils (%) (Auto) 80H, Lymphocytes (%) (Auto) 

10L, Monocytes (%) (Auto) 7, Eosinophils (%) (Auto) 2, Basophils (%) (Auto) 0, 

Neutrophils # (Auto) 11.5H, Lymphocytes # (Auto) 1.5, Monocytes # (Auto) 1.1H, 

Eosinophils # (Auto) 0.4H, Basophils # (Auto) 0.0, Neutrophils % (Manual) 81, 

Lymphocytes % (Manual) 13, Monocytes % (Manual) 4, Eosinophils % (Manual) 2, 

Sodium Level 142, Potassium Level 4.0, Chloride Level 109H, Carbon Dioxide Level

24, Anion Gap 9, Blood Urea Nitrogen 26H, Creatinine 0.90, Estimat Glomerular 

Filtration Rate > 60, BUN/Creatinine Ratio 29, Glucose Level 128H, Calcium Level

8.1L, Phosphorus Level 2.0L, Magnesium Level 1.9





Microbiology


12/15/19 MRSA Screen - Final, Complete





Assessment/Plan


Assessment/Plan


Assess & Plan/Chief Complaint


perforated sigmoid diverticulitis s/p sigmoid colectomy, end colostomy, 

hartmans.


continue ambulation/PT/OT.


diet as tolerated.


will need to f/u in office in 2 weeks for staple removal.


SS for placement. likely [facility] on monday. 





Clinical Quality Measures


DVT/VTE Risk/Contraindication:


Risk Factor Score Per Nursin


RFS Level Per Nursing on Admit:  4+=Very High











SUNG BARR MD                Dec 22, 2019 11:48

## 2019-12-22 NOTE — CARDIOLOGY PROGRESS NOTE
Cardiology SOAP Progress Note


Subjective:


No cardiac complaints.





Objective:


I&O/Vital Signs











 12/22/19 12/22/19 12/22/19 12/22/19





 00:04 01:00 02:40 04:00


 


Temp 36.8   36.2


 


Pulse 67 65  65


 


Resp 18   20


 


B/P (MAP) 113/59 (77)   128/73 (91)


 


Pulse Ox 93  91 94


 


O2 Delivery Room Air  Room Air Room Air


 


    





 12/22/19 12/22/19 12/22/19 12/22/19





 07:00 07:53 08:00 09:15


 


Temp  36.9  


 


Pulse 70 62  


 


Resp  18  


 


B/P (MAP)  124/60 (81)  


 


Pulse Ox  95  91


 


O2 Delivery  Room Air Nasal Cannula Nasal Cannula


 


O2 Flow Rate   2.00 2.00














 12/22/19





 00:00


 


Intake Total 800 ml


 


Output Total 615 ml


 


Balance 185 ml








Weight (Pounds):  175


Weight (Calculated Kilograms):  79.236929


Constitutional:  appears stated age, AAO x 3; No apparent distress; well-

developed, well-nourished


Respiratory:  chest is bilaterally symmetric, lungs clear to auscultation


Cardiovascular:  irregularly irregular, S1 and S2


Gastrointestional:  distended, tenderness; No spleenomegaly


Extremities:  normal range of motion, non-tender, normal inspection; No 

clubbing, No cyanosis; no lower extremity edema bilateral; No significant edema


Neurologic/Psychiatric:  no motor/sensory deficits, alert, normal mood/affect, 

oriented x 3, power is 5/5 both on sides


Skin:  normal color; No rash, No ulcerations





Results/Procedures:


Labs


Laboratory Tests


12/22/19 05:20: 


White Blood Count 14.5H, Red Blood Count 3.71L, Hemoglobin 10.8L, Hematocrit 35L

, Mean Corpuscular Volume 93, Mean Corpuscular Hemoglobin 29, Mean Corpuscular 

Hemoglobin Concent 31L, Red Cell Distribution Width 13.4, Platelet Count 251, 

Mean Platelet Volume 11.2H, Neutrophils (%) (Auto) 80H, Lymphocytes (%) (Auto) 

10L, Monocytes (%) (Auto) 7, Eosinophils (%) (Auto) 2, Basophils (%) (Auto) 0, 

Neutrophils # (Auto) 11.5H, Lymphocytes # (Auto) 1.5, Monocytes # (Auto) 1.1H, 

Eosinophils # (Auto) 0.4H, Basophils # (Auto) 0.0, Neutrophils % (Manual) 81, 

Lymphocytes % (Manual) 13, Monocytes % (Manual) 4, Eosinophils % (Manual) 2, 

Sodium Level 142, Potassium Level 4.0, Chloride Level 109H, Carbon Dioxide Level

24, Anion Gap 9, Blood Urea Nitrogen 26H, Creatinine 0.90, Estimat Glomerular F

iltration Rate > 60, BUN/Creatinine Ratio 29, Glucose Level 128H, Calcium Level 

8.1L, Phosphorus Level 2.0L, Magnesium Level 1.9





Microbiology


12/15/19 MRSA Screen - Final, Complete


           








A/P:


Assessment/Dx:


Perforated bowel, status post surgery 12/16/2019,


Anemia,


Chronic kidney disease,


Atrial fibrillation with rapid ventricular rate,


Mild shortness of breath


Plan:


Perforated bowel, status post surgery 12/16/2019, recovered.





Anemia,





Chronic kidney disease, on IV fluids.





Atrial fibrillation with controlled ventricular rate.  Eliquis 2.5 mg twice a 

day.  Cardizem.





Mild shortness of breath, mild elevated BNP.  Very likely mild acute diastolic 

congestive heart failure.  Echocardiogram done 12/18/2019 shows normal LV 

function.  Since the patient was in atrial fibrillation therefore diastolic 

function was not evaluated.  Moderate tricuspid regurgitation.  PA pressure of 

55 mmHg. improved shortness of breath.





Awaiting transfer to skilled nursing facility on Monday 12/23/2019.








Thank you for your consultation. Please call me if you have any questions.








NAMAN Lopez MD, FACP, FACC, FSCAI, FHRS, CCDS


Interventional Cardiology


Cardiac Electrophysiology


Vascular Medicine and Endovascular Interventions











GOMEZ LOPEZ MD             Dec 22, 2019 10:57

## 2019-12-22 NOTE — NUR
RD ASSESSMENT 



PMHx: HTN; DVT; CVA



PT INTERACTION: Pt was awake and pleasant during nutrition assessment. Pt states current 
appetite is so-so and has been for some time. Note pt avg PO intake of 32% x3d, per chart 
review. Pt states following a regular diet at home, and has no issues with 
chewing/swallowing food at this time. Pt states no recent issues with n/v/c/d at this time. 
Note pt has colostomy, and pt states output is good. Pt states no recent wt changes. Note 
unable to determine recent wt hx, per chart review. 



ABNORMAL NUTRITION-RELATED LAB VALUES

LOW: Ca 8.1; phos 2.0

HIGH: Cl 109; BUN 26; glu 128



Est. kcal needs: 4130-5382 kcal | 20-25 kcal/kg 

Est. Pro needs:   g Pro | 1.0-1.2 g Pro/kg



PES STATEMENT: Inadequate oral intake (NI-2.1) related to loss of appetite as evidenced by 
pt interview | PO intake 32% x3d



INTERVENTION:  

Continue with current diet order of DYS3 Advanced/Ground Meat diet. 

Add Ensure Enlive to meals TID, for increased kcal intake. Provides 350 kcal and 13 g Pro 
per serving. 

Will continue to follow and reassess as pt needs and status change. 



MONITOR/EVALUATE:  

PO Intake; Plan of Care; Hydration Status; Weight Status; Lab Values 





KAYDEN Marks, MS, RD, LD

## 2019-12-22 NOTE — PROGRESS NOTE - HOSPITALIST
Subjective


HPI/CC On Admission


Date Seen by Provider:  Dec 22, 2019


Time Seen by Provider:  11:23


Patient is an 89-year-old  male with a past medical history of 

hypertension, stroke who presented to the emergency room due to abdominal pain. 

He reports his symptoms started yesterday and complained of pain all across the 

middle of his abdomen.  He has no previous history of similar pain but he does 

have a history of diverticulosis.  His last bowel movement was yesterday.  He 

denies any fevers or chills.  He was found to have perforated bowel and was 

admitted to the surgical services.  He is to go to the operating room this 

afternoon.  I am consulted for medical management.


Subjective/Events-last exam


Pt reports doing well. No complaints. Discussed with RN about tourniquet being 

left on this AM. Patient denies any complaints regarding this. Had large bruise 

there previously as well.





Objective


Exam


Vital Signs





Vital Signs








  Date Time  Temp Pulse Resp B/P (MAP) Pulse Ox O2 Delivery O2 Flow Rate FiO2


 


19 09:15     91 Nasal Cannula 2.00 


 


19 07:53 36.9 62 18 124/60 (81)    


 


19 21:43        28





Capillary Refill : Less Than 3 SecondsLess Than 3 Seconds


General Appearance:  No Apparent Distress, Chronically ill


Respiratory:  Lungs Clear, No Respiratory Distress


Cardiovascular:  No Murmur, Irregularly Irregular


Gastrointestinal:  Non Tender, Soft, Other (ostomy in place)


Extremity:  Other (right arm with bruising on anterior aspect and ligature marks

noted)





Results/Procedures


Lab


Laboratory Tests


19 05:20








Patient resulted labs reviewed.


Imaging:  Reviewed Imaging Report





Assessment/Plan


Assessment and Plan


Assess & Plan/Chief Complaint


Perforated Diverticulum


   Management per primary


   POD #5 s/p resection and colostomy


   Continue Merrem


   Continue Pain regimen


   Await bowel function





A-fib


   rate controlled


   Monitor on telemetry


   Cardiology consulted, appreciate recs


   Eliquis ordered, age adjusted dose





HTN


   BP well controlled





CKD


   Continue IVF


   UOP adequate


   Creatinine improved





History of CVA with right side deficits


   At baseline


   Dc plavix as is now on Eliquis





Anxiety


   Ativan added prn as NPO


   Risperdal 





Medically stable for discharge when ok with primary. Will round prn.





Diagnosis/Problems


Diagnosis/Problems





(1) Perforated diverticulum


Status:  Acute


(2) Hypertension


Qualifiers:  


   Hypertension type:  essential hypertension  Qualified Codes:  I10 - Essential

(primary) hypertension


(3) History of stroke


Status:  Chronic





Clinical Quality Measures


DVT/VTE Risk/Contraindication:


Risk Factor Score Per Nursin


RFS Level Per Nursing on Admit:  4+=Very High











EMMY SINGLETARY MD             Dec 22, 2019 11:28 am

## 2019-12-23 VITALS — SYSTOLIC BLOOD PRESSURE: 94 MMHG | DIASTOLIC BLOOD PRESSURE: 56 MMHG

## 2019-12-23 VITALS — SYSTOLIC BLOOD PRESSURE: 163 MMHG | DIASTOLIC BLOOD PRESSURE: 70 MMHG

## 2019-12-23 VITALS — SYSTOLIC BLOOD PRESSURE: 117 MMHG | DIASTOLIC BLOOD PRESSURE: 59 MMHG

## 2019-12-23 VITALS — DIASTOLIC BLOOD PRESSURE: 70 MMHG | SYSTOLIC BLOOD PRESSURE: 163 MMHG

## 2019-12-23 VITALS — DIASTOLIC BLOOD PRESSURE: 72 MMHG | SYSTOLIC BLOOD PRESSURE: 123 MMHG

## 2019-12-23 LAB
BASOPHILS # BLD AUTO: 0 10^3/UL (ref 0–0.1)
BASOPHILS NFR BLD AUTO: 0 % (ref 0–10)
BUN/CREAT SERPL: 31
CALCIUM SERPL-MCNC: 7.8 MG/DL (ref 8.5–10.1)
CHLORIDE SERPL-SCNC: 107 MMOL/L (ref 98–107)
CO2 SERPL-SCNC: 24 MMOL/L (ref 21–32)
CREAT SERPL-MCNC: 0.84 MG/DL (ref 0.6–1.3)
EOSINOPHIL # BLD AUTO: 0.4 10^3/UL (ref 0–0.3)
EOSINOPHIL NFR BLD AUTO: 3 % (ref 0–10)
ERYTHROCYTE [DISTWIDTH] IN BLOOD BY AUTOMATED COUNT: 13.5 % (ref 10–14.5)
GFR SERPLBLD BASED ON 1.73 SQ M-ARVRAT: > 60 ML/MIN
GLUCOSE SERPL-MCNC: 104 MG/DL (ref 70–105)
HCT VFR BLD CALC: 32 % (ref 40–54)
HGB BLD-MCNC: 10.2 G/DL (ref 13.3–17.7)
LYMPHOCYTES # BLD AUTO: 1.8 X 10^3 (ref 1–4)
LYMPHOCYTES NFR BLD AUTO: 16 % (ref 12–44)
MAGNESIUM SERPL-MCNC: 1.7 MG/DL (ref 1.6–2.4)
MANUAL DIFFERENTIAL PERFORMED BLD QL: NO
MCH RBC QN AUTO: 29 PG (ref 25–34)
MCHC RBC AUTO-ENTMCNC: 32 G/DL (ref 32–36)
MCV RBC AUTO: 93 FL (ref 80–99)
MONOCYTES # BLD AUTO: 0.9 X 10^3 (ref 0–1)
MONOCYTES NFR BLD AUTO: 7 % (ref 0–12)
NEUTROPHILS # BLD AUTO: 8.7 X 10^3 (ref 1.8–7.8)
NEUTROPHILS NFR BLD AUTO: 74 % (ref 42–75)
PHOSPHATE SERPL-MCNC: 2.5 MG/DL (ref 2.3–4.7)
PLATELET # BLD: 233 10^3/UL (ref 130–400)
PMV BLD AUTO: 10.9 FL (ref 7.4–10.4)
POTASSIUM SERPL-SCNC: 3.8 MMOL/L (ref 3.6–5)
SODIUM SERPL-SCNC: 140 MMOL/L (ref 135–145)
WBC # BLD AUTO: 11.8 10^3/UL (ref 4.3–11)

## 2019-12-23 RX ADMIN — ALBUTEROL SULFATE SCH MG: 2.5 SOLUTION RESPIRATORY (INHALATION) at 02:57

## 2019-12-23 RX ADMIN — POTASSIUM CHLORIDE SCH MEQ: 1500 TABLET, EXTENDED RELEASE ORAL at 05:50

## 2019-12-23 RX ADMIN — AMLODIPINE BESYLATE SCH MG: 5 TABLET ORAL at 08:10

## 2019-12-23 RX ADMIN — PROPRANOLOL HYDROCHLORIDE SCH MG: 20 TABLET ORAL at 08:10

## 2019-12-23 RX ADMIN — PANTOPRAZOLE SODIUM SCH MG: 40 TABLET, DELAYED RELEASE ORAL at 08:09

## 2019-12-23 RX ADMIN — POTASSIUM CHLORIDE SCH MLS/HR: 200 INJECTION, SOLUTION INTRAVENOUS at 05:50

## 2019-12-23 RX ADMIN — APIXABAN SCH MG: 5 TABLET, FILM COATED ORAL at 08:10

## 2019-12-23 RX ADMIN — MAGNESIUM SULFATE IN DEXTROSE SCH MLS/HR: 10 INJECTION, SOLUTION INTRAVENOUS at 05:50

## 2019-12-23 RX ADMIN — DOCUSATE SODIUM AND SENNOSIDES SCH EA: 8.6; 5 TABLET, FILM COATED ORAL at 08:10

## 2019-12-23 RX ADMIN — ALBUTEROL SULFATE SCH MG: 2.5 SOLUTION RESPIRATORY (INHALATION) at 07:13

## 2019-12-23 NOTE — NUR
Arrangements made for pt transfer to Skilled Unit at [facility]. Discharge orders and 
medical reports faxed to [facility] for their review. Transportation set up for 1430 
and family advised.

## 2019-12-23 NOTE — DISCHARGE INST-SKILLED NURSING
Discharge Inst-Skilled NF


Reconcile Patient Problems


Problems Reviewed?:  Yes





Consult/Follow Up/Orders


Follow Up Appt.:  


next nursing home rounds, surgery follow up in about one week with Dr. Gold Sherwood NF Admit to:  [facility]


Certification (SNF)


I certify that SNF services are required to be given on an inpatient basis 

because of the above named patient's need for skilled nursing care on a 

continuing basis for the conditions(s) for which he/she was receiving inpatient 

hospital services prior to his/her transfer to the SNF.


Skilled Nursing Facility Order:  Nursing Services, Occupational Ther-Evaluate & 

Treat, Physical Therapy-Evaluate & Treat


Oxygen Delivery Method:  Room Air


Discharge Diet:  No Restrictions


Daily Activity as Tolerated:  Yes


Resuscitation Status:  Full Code





New & Resume Previous Orders


Vianey Osborn 


Dec 23, 2019 


10:50











VIANEY OSBORN MD              Dec 23, 2019 10:51

## 2019-12-23 NOTE — PHYSICAL THERAPY DAILY NOTE
PT Daily Note-Current


Subjective


Patient agrees to PT.  No c/o.





Mental Status


Patient Orientation:  Normal For Age





Transfers


SCALE: Activities may be completed with or without assistive devices.





6-Indepedent-patient completes the activity by him/herself with no assistance 

from a helper.


5-Set-up or Clean-up Assistance-helper sets up or cleans up; patient completes 

activity. [place] assists only prior to or  


    following the activity.


4-Supervision or Touching Assistance-helper provides verbal cues and/or 

touching/steadying and/or contact guard assistance as patient completes 

activity. Assistance may be provided   


    throughout the activity or intermittently.


3-Partial/Moderate Assistance-helper does LESS THAN HALF the effort. [place] 

lifts, holds or supports trunk or limbs, but provides less than half the effort.


2-Substantial/Maximal Assistance-helper does MORE THAN HALF the effort. [place] 

lifts or holds trunk or limbs and provides more than half the effort.


9-Cllfyaagz-[id number] does ALL the effort. Patient does none of the effort to c

omplete the activity. Or, the assistance of 2 or more helpers is required for 

the patient to complete the  


    activity.


If activity was not attempted, code reason:


7-Patient Refused.


9-Not Applicable-not attempted and the patient did not perform the activity 

before the current illness, exacerbation or injury.


10-Not Attempted due to Environmental Limitations-(lack of equipment, weather 

restraints, etc.).


88-Not Attempted due to Medical Conditions or Safety Concerns.


Sit to Stand (QC):  5





Weight Bearing


Right Lower Extremity:  Right


Weight Bearing/Tolerated


Left Lower Extremity:  Left


Weight Bearing/Tolerated





Gait Training


Does the Patient Walk?:  Yes


Distance:  50'


Walk 10 feet (QC):  4


Walk 50 ft with 2 Turns(QC):  4


Walk 150 ft (QC):  88


Gait Assistive Device:  FWW


trunk flexed posture in FWW





Exercises


Seated Therapy Exercises:  Long arc quads


Seated Reps:  15





Assessment


Patient tolerated treatment well and remains up in recliner with needs met.  PT 

to increase activity as tolerated by patient.





PT Long Term Goals


Long Term Goals


PT Long Term Goals Time Frame:  Dec 25, 2019


Roll Left & Right (QC):  6


Sit to Lying (QC):  6


Lying-Sitting on Side/Bed(QC):  6


Sit to Stand (QC):  6


Chair/Bed-to-Chair Xfer(QC):  6


Toilet Transfer (QC):  6


Car Transfer (QC):  6


Does the Patient Walk:  Yes


Walk 10 feet (QC):  6


Walk 50ft with 2 Turns (QC):  6


Walk 150 ft (QC):  6


Walking 10ft on Uneven Surface:  6


1 Step (curb) (QC):  6


4 Steps (QC):  6


12 Steps (QC):  9


Picking up an Object (QC):  88


Does the Pt use WC or Scooter?:  No


Type:  N/A


Type:  N/A





PT Plan


Treatment/Plan


Treatment Plan:  Continue Plan of Care


Treatment Plan:  Bed Mobility, Education, Functional Activity Patricio, Functional 

Strength, Gait, Safety, Therapeutic Exercise, Transfers


Treatment Duration:  Dec 25, 2019


Frequency:  5 times per week (to 6 )


Estimated Hrs Per Day:  .25 hour per day


Patient and/or Family Agrees t:  Yes





Time/GCodes


Time In:  830


Time Out:  842


Total Billed Treatment Time:  12


Total Billed Treatment


1 visit


FA 12 min











RAYRAY IVORY PT              Dec 23, 2019 09:38

## 2019-12-23 NOTE — NUR
PT MG IS 1.7 THIS MORNING. HE HAS ORDER FOR MG REPLACEMENT IV AT THIS VALUE. HOWEVER PT'S IV 
WAS TAKEN OUT YESTERDAY AND DAY RN REPORTED THAT DR SINGLETARY HAD ORDERED TO NOT REPLACE IV SITE. 
AT THIS TIME PT HAS NO IV ACCESS.

## 2020-06-08 ENCOUNTER — HOSPITAL ENCOUNTER (OUTPATIENT)
Dept: HOSPITAL 75 - PREOP | Age: 85
Discharge: HOME | End: 2020-06-08
Attending: SURGERY
Payer: MEDICARE

## 2020-06-08 VITALS — SYSTOLIC BLOOD PRESSURE: 138 MMHG | DIASTOLIC BLOOD PRESSURE: 71 MMHG

## 2020-06-08 VITALS — WEIGHT: 192.68 LBS | BODY MASS INDEX: 30.97 KG/M2 | HEIGHT: 66.02 IN

## 2020-06-08 DIAGNOSIS — Z11.59: ICD-10-CM

## 2020-06-08 DIAGNOSIS — Z11.2: ICD-10-CM

## 2020-06-08 DIAGNOSIS — Z01.818: Primary | ICD-10-CM

## 2020-06-08 PROCEDURE — 87635 SARS-COV-2 COVID-19 AMP PRB: CPT

## 2020-06-08 PROCEDURE — 87081 CULTURE SCREEN ONLY: CPT

## 2022-04-01 NOTE — CARDIOLOGY PROGRESS NOTE
Cardiology SOAP Progress Note


Subjective:


No cardiac complaints.





Objective:


I&O/Vital Signs











 12/23/19 12/23/19 12/23/19





 11:45 12:00 15:35


 


Temp 37.4 36.6 36.6


 


Pulse 65 59 59


 


Resp  18 18


 


B/P (MAP)  94/56 (69) 94/56


 


Pulse Ox 93 95 95


 


O2 Delivery  Room Air Room Air


 


O2 Flow Rate   2.00


 


FiO2 28  














 12/23/19





 00:00


 


Intake Total 1260 ml


 


Output Total 350 ml


 


Balance 910 ml








Weight (Pounds):  175


Weight (Calculated Kilograms):  79.752344


Constitutional:  appears stated age, AAO x 3; No apparent distress; well-

developed, well-nourished


Respiratory:  chest is bilaterally symmetric, lungs clear to auscultation


Cardiovascular:  irregularly irregular, S1 and S2


Gastrointestional:  distended, tenderness; No spleenomegaly


Extremities:  normal range of motion, non-tender, normal inspection; No 

clubbing, No cyanosis; no lower extremity edema bilateral; No significant edema


Neurologic/Psychiatric:  no motor/sensory deficits, alert, normal mood/affect, 

oriented x 3, power is 5/5 both on sides


Skin:  normal color; No rash, No ulcerations





Results/Procedures:


Labs


Laboratory Tests


12/23/19 04:55: 


White Blood Count 11.8H, Red Blood Count 3.47L, Hemoglobin 10.2L, Hematocrit 32L

, Mean Corpuscular Volume 93, Mean Corpuscular Hemoglobin 29, Mean Corpuscular 

Hemoglobin Concent 32, Red Cell Distribution Width 13.5, Platelet Count 233, 

Mean Platelet Volume 10.9H, Neutrophils (%) (Auto) 74, Lymphocytes (%) (Auto) 

16, Monocytes (%) (Auto) 7, Eosinophils (%) (Auto) 3, Basophils (%) (Auto) 0, 

Neutrophils # (Auto) 8.7H, Lymphocytes # (Auto) 1.8, Monocytes # (Auto) 0.9, 

Eosinophils # (Auto) 0.4H, Basophils # (Auto) 0.0, Sodium Level 140, Potassium 

Level 3.8, Chloride Level 107, Carbon Dioxide Level 24, Anion Gap 9, Blood Urea 

Nitrogen 26H, Creatinine 0.84, Estimat Glomerular Filtration Rate > 60, 

BUN/Creatinine Ratio 31, Glucose Level 104, Calcium Level 7.8L, Phosphorus Level

2.5, Magnesium Level 1.7





Microbiology


12/15/19 MRSA Screen - Final, Complete


           








A/P:


Assessment/Dx:


Perforated bowel, status post surgery 12/16/2019,


Anemia,


Chronic kidney disease,


Atrial fibrillation with rapid ventricular rate,


Mild shortness of breath


Plan:


Perforated bowel, status post surgery 12/16/2019, recovered.





Anemia,





Chronic kidney disease, on IV fluids.





Atrial fibrillation with controlled ventricular rate.  Eliquis 2.5 mg twice a 

day.  Cardizem.





Mild shortness of breath, mild elevated BNP.  Very likely mild acute diastolic 

congestive heart failure.  Echocardiogram done 12/18/2019 shows normal LV 

function.  Since the patient was in atrial fibrillation therefore diastolic 

function was not evaluated.  Moderate tricuspid regurgitation.  PA pressure of 

55 mmHg. improved shortness of breath.





Awaiting transfer to skilled nursing facility on Monday 12/23/2019.  Follow-up 

in my office in a month.








Thank you for your consultation. Please call me if you have any questions.








NAMAN Lopez MD, FACP, FACC, FSCAI, FHRS, CCDS


Interventional Cardiology


Cardiac Electrophysiology


Vascular Medicine and Endovascular Interventions











GOMEZ LOPEZ MD             Dec 23, 2019 20:12 [No Acute Distress] : no acute distress [Supple] : supple [No JVD] : no jvd [Normal S1, S2] : normal s1, s2 [No Murmurs] : no murmurs [Clear to Auscultation Bilaterally] : clear to auscultation bilaterally [Normal to Percussion] : normal to percussion [No Abnormalities] : no abnormalities [Benign] : benign [No HSM] : no hsm [No Clubbing] : no clubbing [No Cyanosis] : no cyanosis [No Edema] : no edema